# Patient Record
Sex: FEMALE | Race: WHITE | Employment: OTHER | URBAN - METROPOLITAN AREA
[De-identification: names, ages, dates, MRNs, and addresses within clinical notes are randomized per-mention and may not be internally consistent; named-entity substitution may affect disease eponyms.]

---

## 2017-10-03 ENCOUNTER — HOSPITAL ENCOUNTER (INPATIENT)
Age: 82
LOS: 3 days | Discharge: HOME HEALTH | DRG: 603 | End: 2017-10-06
Attending: EMERGENCY MEDICINE | Admitting: INTERNAL MEDICINE
Payer: MEDICARE

## 2017-10-03 DIAGNOSIS — L03.115 CELLULITIS OF RIGHT LOWER EXTREMITY: Primary | ICD-10-CM

## 2017-10-03 PROBLEM — I87.2 VENOUS INSUFFICIENCY OF BOTH LOWER EXTREMITIES: Status: ACTIVE | Noted: 2017-10-03

## 2017-10-03 LAB
ALBUMIN SERPL-MCNC: 2.3 G/DL (ref 3.4–5)
ALBUMIN/GLOB SERPL: 0.5 {RATIO} (ref 0.8–1.7)
ALP SERPL-CCNC: 101 U/L (ref 45–117)
ALT SERPL-CCNC: 37 U/L (ref 13–56)
ANION GAP SERPL CALC-SCNC: 9 MMOL/L (ref 3–18)
AST SERPL-CCNC: 35 U/L (ref 15–37)
BASOPHILS # BLD: 0 K/UL (ref 0–0.06)
BASOPHILS NFR BLD: 0 % (ref 0–2)
BILIRUB SERPL-MCNC: 0.7 MG/DL (ref 0.2–1)
BUN SERPL-MCNC: 24 MG/DL (ref 7–18)
BUN/CREAT SERPL: 18 (ref 12–20)
CALCIUM SERPL-MCNC: 8.7 MG/DL (ref 8.5–10.1)
CHLORIDE SERPL-SCNC: 100 MMOL/L (ref 100–108)
CO2 SERPL-SCNC: 26 MMOL/L (ref 21–32)
CREAT SERPL-MCNC: 1.35 MG/DL (ref 0.6–1.3)
DIFFERENTIAL METHOD BLD: ABNORMAL
EOSINOPHIL # BLD: 0.2 K/UL (ref 0–0.4)
EOSINOPHIL NFR BLD: 1 % (ref 0–5)
ERYTHROCYTE [DISTWIDTH] IN BLOOD BY AUTOMATED COUNT: 13.4 % (ref 11.6–14.5)
GLOBULIN SER CALC-MCNC: 4.8 G/DL (ref 2–4)
GLUCOSE SERPL-MCNC: 97 MG/DL (ref 74–99)
HCT VFR BLD AUTO: 32.9 % (ref 35–45)
HGB BLD-MCNC: 11 G/DL (ref 12–16)
LACTATE BLD-SCNC: 1.2 MMOL/L (ref 0.4–2)
LYMPHOCYTES # BLD: 1.5 K/UL (ref 0.9–3.6)
LYMPHOCYTES NFR BLD: 11 % (ref 21–52)
MCH RBC QN AUTO: 30.7 PG (ref 24–34)
MCHC RBC AUTO-ENTMCNC: 33.4 G/DL (ref 31–37)
MCV RBC AUTO: 91.9 FL (ref 74–97)
MONOCYTES # BLD: 1.1 K/UL (ref 0.05–1.2)
MONOCYTES NFR BLD: 8 % (ref 3–10)
NEUTS SEG # BLD: 10.8 K/UL (ref 1.8–8)
NEUTS SEG NFR BLD: 80 % (ref 40–73)
PLATELET # BLD AUTO: 387 K/UL (ref 135–420)
PMV BLD AUTO: 10.9 FL (ref 9.2–11.8)
POTASSIUM SERPL-SCNC: 4 MMOL/L (ref 3.5–5.5)
PROT SERPL-MCNC: 7.1 G/DL (ref 6.4–8.2)
RBC # BLD AUTO: 3.58 M/UL (ref 4.2–5.3)
SODIUM SERPL-SCNC: 135 MMOL/L (ref 136–145)
WBC # BLD AUTO: 13.6 K/UL (ref 4.6–13.2)

## 2017-10-03 PROCEDURE — 74011250636 HC RX REV CODE- 250/636: Performed by: EMERGENCY MEDICINE

## 2017-10-03 PROCEDURE — 77030033263 HC DRSG MEPILEX 16-48IN BORD MOLN -B

## 2017-10-03 PROCEDURE — 74011250637 HC RX REV CODE- 250/637: Performed by: INTERNAL MEDICINE

## 2017-10-03 PROCEDURE — 99284 EMERGENCY DEPT VISIT MOD MDM: CPT

## 2017-10-03 PROCEDURE — 93971 EXTREMITY STUDY: CPT

## 2017-10-03 PROCEDURE — 96365 THER/PROPH/DIAG IV INF INIT: CPT

## 2017-10-03 PROCEDURE — 87040 BLOOD CULTURE FOR BACTERIA: CPT | Performed by: EMERGENCY MEDICINE

## 2017-10-03 PROCEDURE — 96375 TX/PRO/DX INJ NEW DRUG ADDON: CPT

## 2017-10-03 PROCEDURE — 85025 COMPLETE CBC W/AUTO DIFF WBC: CPT | Performed by: EMERGENCY MEDICINE

## 2017-10-03 PROCEDURE — 74011000258 HC RX REV CODE- 258: Performed by: EMERGENCY MEDICINE

## 2017-10-03 PROCEDURE — 80053 COMPREHEN METABOLIC PANEL: CPT | Performed by: EMERGENCY MEDICINE

## 2017-10-03 PROCEDURE — 77030020186 HC BOOT HL PROTCT SAGE -B

## 2017-10-03 PROCEDURE — 83605 ASSAY OF LACTIC ACID: CPT

## 2017-10-03 PROCEDURE — 74011250636 HC RX REV CODE- 250/636: Performed by: INTERNAL MEDICINE

## 2017-10-03 PROCEDURE — 65270000029 HC RM PRIVATE

## 2017-10-03 RX ORDER — HEPARIN SODIUM 5000 [USP'U]/ML
5000 INJECTION, SOLUTION INTRAVENOUS; SUBCUTANEOUS EVERY 8 HOURS
Status: DISCONTINUED | OUTPATIENT
Start: 2017-10-03 | End: 2017-10-06 | Stop reason: HOSPADM

## 2017-10-03 RX ORDER — HYDRALAZINE HYDROCHLORIDE 25 MG/1
25 TABLET, FILM COATED ORAL 2 TIMES DAILY
COMMUNITY

## 2017-10-03 RX ORDER — LEVOTHYROXINE SODIUM 75 UG/1
75 TABLET ORAL
Status: DISCONTINUED | OUTPATIENT
Start: 2017-10-04 | End: 2017-10-06 | Stop reason: HOSPADM

## 2017-10-03 RX ORDER — AMLODIPINE BESYLATE 10 MG/1
10 TABLET ORAL DAILY
COMMUNITY

## 2017-10-03 RX ORDER — SODIUM CHLORIDE 9 MG/ML
75 INJECTION, SOLUTION INTRAVENOUS CONTINUOUS
Status: DISCONTINUED | OUTPATIENT
Start: 2017-10-03 | End: 2017-10-06 | Stop reason: HOSPADM

## 2017-10-03 RX ORDER — CLONIDINE HYDROCHLORIDE 0.1 MG/1
0.2 TABLET ORAL 2 TIMES DAILY
Status: DISCONTINUED | OUTPATIENT
Start: 2017-10-03 | End: 2017-10-06 | Stop reason: HOSPADM

## 2017-10-03 RX ORDER — LOSARTAN POTASSIUM 50 MG/1
50 TABLET ORAL DAILY
Status: DISCONTINUED | OUTPATIENT
Start: 2017-10-04 | End: 2017-10-06 | Stop reason: HOSPADM

## 2017-10-03 RX ORDER — HYDRALAZINE HYDROCHLORIDE 20 MG/ML
10 INJECTION INTRAMUSCULAR; INTRAVENOUS
Status: DISCONTINUED | OUTPATIENT
Start: 2017-10-03 | End: 2017-10-06 | Stop reason: HOSPADM

## 2017-10-03 RX ORDER — PANTOPRAZOLE SODIUM 40 MG/1
40 TABLET, DELAYED RELEASE ORAL DAILY
COMMUNITY

## 2017-10-03 RX ORDER — NYSTATIN 100000 [USP'U]/G
POWDER TOPICAL 2 TIMES DAILY
Status: DISCONTINUED | OUTPATIENT
Start: 2017-10-03 | End: 2017-10-06 | Stop reason: HOSPADM

## 2017-10-03 RX ORDER — FENTANYL 50 UG/1
1 PATCH TRANSDERMAL
COMMUNITY

## 2017-10-03 RX ORDER — FENTANYL CITRATE 50 UG/ML
25 INJECTION, SOLUTION INTRAMUSCULAR; INTRAVENOUS
Status: COMPLETED | OUTPATIENT
Start: 2017-10-03 | End: 2017-10-03

## 2017-10-03 RX ORDER — DOXYCYCLINE 100 MG/1
100 TABLET ORAL 2 TIMES DAILY
COMMUNITY
Start: 2017-10-03 | End: 2017-10-06

## 2017-10-03 RX ORDER — POLYETHYLENE GLYCOL 3350 17 G/17G
17 POWDER, FOR SOLUTION ORAL
COMMUNITY

## 2017-10-03 RX ADMIN — HEPARIN SODIUM 5000 UNITS: 5000 INJECTION, SOLUTION INTRAVENOUS; SUBCUTANEOUS at 12:32

## 2017-10-03 RX ADMIN — FENTANYL CITRATE 25 MCG: 50 INJECTION, SOLUTION INTRAMUSCULAR; INTRAVENOUS at 10:56

## 2017-10-03 RX ADMIN — HEPARIN SODIUM 5000 UNITS: 5000 INJECTION, SOLUTION INTRAVENOUS; SUBCUTANEOUS at 21:00

## 2017-10-03 RX ADMIN — SODIUM CHLORIDE 75 ML/HR: 900 INJECTION, SOLUTION INTRAVENOUS at 12:35

## 2017-10-03 RX ADMIN — SODIUM CHLORIDE 1000 MG: 900 INJECTION, SOLUTION INTRAVENOUS at 11:38

## 2017-10-03 RX ADMIN — CLONIDINE HYDROCHLORIDE 0.2 MG: 0.1 TABLET ORAL at 12:29

## 2017-10-03 RX ADMIN — CEFTRIAXONE 1 G: 1 INJECTION, POWDER, FOR SOLUTION INTRAMUSCULAR; INTRAVENOUS at 10:39

## 2017-10-03 RX ADMIN — NYSTATIN: 100000 POWDER TOPICAL at 23:00

## 2017-10-03 RX ADMIN — SODIUM CHLORIDE 1000 MG: 900 INJECTION, SOLUTION INTRAVENOUS at 15:30

## 2017-10-03 NOTE — PROGRESS NOTES
conducted an initial consultation and Spiritual Assessment for Hitesh Valentin, who is a 80 y.o.,female. Patients Primary Language is: [place]. According to the patients EMR Sabianist Affiliation is: Latter-day. The reason the Patient came to the hospital is:   Patient Active Problem List    Diagnosis Date Noted    Cellulitis of right leg 10/03/2017    Venous insufficiency of both lower extremities 10/03/2017    Leukocytosis 06/08/2015    UTI (urinary tract infection) 06/08/2015    History of CVA (cerebrovascular accident) 06/08/2015    Hypothyroidism 06/08/2015    Fall 06/08/2015    Hypertension 06/08/2015    GERD (gastroesophageal reflux disease) 06/08/2015    Lymphedema 06/08/2015    Legal blindness 06/08/2015    Osteoarthritis     Psoriatic arthritis ([facility] [address]         The  provided the following Interventions:  Initiated a relationship of care and support. Explored issues of [place], spirituality and/or Mormon needs while hospitalized. Listened empathically. Provided chaplaincy education. Provided information about Spiritual Care Services. Offered prayer and assurance of continued prayers on patient's behalf. Chart reviewed. The following outcomes were achieved:  Patient shared some information about their medical narrative and spiritual journey/beliefs. Patient processed feeling about current hospitalization. Patient expressed gratitude for the 's visit. Assessment:  Patient did not indicate any spiritual or Mormon issues which require Spiritual Care Services interventions at this time. Patient does not have any Mormon/cultural needs that will affect patients preferences in health care. Plan:  Chaplains will continue to follow and will provide pastoral care on an as needed or requested basis.  recommends bedside caregivers page  on duty if patient shows signs of acute spiritual or emotional distress.     [facility]. [facility]   [phone]

## 2017-10-03 NOTE — ACP (ADVANCE CARE PLANNING)
Patient has designated _________Dtr_______________ to participate in his/her discharge plan and to receive any needed information.      Name: Violeta Sheehan  Address:  Phone Luisito Hylton

## 2017-10-03 NOTE — ED PROVIDER NOTES
HPI Comments: 10:15 AM Jessica Espitia is a 80 y.o. Female w/ PMHx of peripheral edema, osteoarthritis, and hypothyroidism who presents to the ED c/o  bilateral lower leg cellulitis and edema onset 2 weeks ago. Pt has been home-bound for the last 2.5 years and is seen regularly by visiting physicians. 2 weeks ago pt was put on Keflex for the cellulitis, which was effective for the infection in the left leg. The following week the visiting physician noticed the infection was spreading to the right leg so the pt was put on doxycycline, which was ineffective; pt has 2-3 days left of doxycycline prescription. Pt has chronic urinary and bowel incontinence but has no hx of DVT or PE. Pt is not on blood thinning medication. Per pt's daughter, pt had a low grade fever of 100.1 last night. Additionally per pt's daughter pt has had episodes of emesis, although it is normally just yellow sputum. Pt has no other sx or complaints. Past Medical History:   Diagnosis Date    Arthropathy, unspecified, site unspecified     Chronic venous insufficiency 2/21/2013    Degenerative disc disease     Osteoarthritis     Peripheral edema 2/21/2013    Psoriatic arthritis (HCC)     Reflux     Thyroid disease     Unspecified cerebral artery occlusion with cerebral infarction     Unspecified cerebral artery occlusion with cerebral infarction     Unspecified hypothyroidism     Urinary frequency 2/21/2013       Past Surgical History:   Procedure Laterality Date    HX HYSTERECTOMY           History reviewed. No pertinent family history. Social History     Social History    Marital status:      Spouse name: N/A    Number of children: N/A    Years of education: N/A     Occupational History    Not on file.      Social History Main Topics    Smoking status: Former Smoker    Smokeless tobacco: Former User    Alcohol use No    Drug use: No    Sexual activity: Not on file     Other Topics Concern    Not on file Social History Narrative         ALLERGIES: Bactrim [sulfamethoprim]; E-mycin [erythromycin]; Iodine; and Milk containing products    Review of Systems   Constitutional: Positive for fever. Gastrointestinal: Positive for vomiting (of yellow sputum). Musculoskeletal:        Bilateral lower extremity edema     All other systems reviewed and are negative. Vitals:    10/03/17 1000 10/03/17 1004 10/03/17 1033 10/03/17 1045   BP: 170/87 170/68 151/63 157/55   Pulse: 88 87 79 76   Resp: 18 18 19 19   Temp: 99.8 °F (37.7 °C)      SpO2: 95% 96% 95% 94%   Weight: 113.4 kg (250 lb)      Height: 5' 8\" (1.727 m)               Physical Exam   Constitutional: She is oriented to person, place, and time. She appears well-developed and well-nourished. HENT:   Head: Normocephalic and atraumatic. Neck: Neck supple. No JVD present. Cardiovascular: Normal rate and regular rhythm. Pulmonary/Chest: Effort normal and breath sounds normal. No respiratory distress. Abdominal: Soft. She exhibits no distension. There is no tenderness. There is no rebound and no guarding. Musculoskeletal: She exhibits edema. 2+ BL LE edema    RLE anterior erythema patchy and warmth, no vesicles  No erythema over calf    LLE mild erythema,     DP 2+ BL  Cap refill 1 sec  Gross sensation intact BL LE   Neurological: She is alert and oriented to person, place, and time. Skin: Skin is warm and dry. There is erythema.    Psychiatric: Judgment normal.        MDM  Number of Diagnoses or Management Options  Diagnosis management comments: 79 y/o female presents with leg pain and redness    Will screen for sepsis with lactate, blood cultures    No hx of DVT or PE, but due to unilateral swelling will obtain duplex       Amount and/or Complexity of Data Reviewed  Clinical lab tests: ordered and reviewed  Tests in the radiology section of CPT®: ordered and reviewed      ED Course       Procedures  Vitals:  Patient Vitals for the past 12 hrs: Temp Pulse Resp BP SpO2   10/03/17 1045 - 76 19 157/55 94 %   10/03/17 1033 - 79 19 151/63 95 %   10/03/17 1004 - 87 18 170/68 96 %   10/03/17 1000 99.8 °F (37.7 °C) 88 18 170/87 95 %       Medications Ordered:  Medications   cefTRIAXone (ROCEPHIN) 1 g in 0.9% sodium chloride (MBP/ADV) 50 mL MBP (1 g IntraVENous New Bag 10/3/17 1039)   vancomycin (VANCOCIN) 1,000 mg in 0.9% sodium chloride (MBP/ADV) 250 mL adv (not administered)   vancomycin (VANCOCIN) 1,000 mg in 0.9% sodium chloride (MBP/ADV) 250 mL adv (not administered)   vancomycin (VANCOCIN) 1,250 mg in 0.9% sodium chloride 250 mL IVPB (not administered)   VANCOMYCIN INFORMATION NOTE (not administered)   fentaNYL citrate (PF) injection 25 mcg (25 mcg IntraVENous Given 10/3/17 1056)       Lab Findings:  Recent Results (from the past 12 hour(s))   CBC WITH AUTOMATED DIFF    Collection Time: 10/03/17 10:10 AM   Result Value Ref Range    WBC 13.6 (H) 4.6 - 13.2 K/uL    RBC 3.58 (L) 4.20 - 5.30 M/uL    HGB 11.0 (L) 12.0 - 16.0 g/dL    HCT 32.9 (L) 35.0 - 45.0 %    MCV 91.9 74.0 - 97.0 FL    MCH 30.7 24.0 - 34.0 PG    MCHC 33.4 31.0 - 37.0 g/dL    RDW 13.4 11.6 - 14.5 %    PLATELET 333 639 - 572 K/uL    MPV 10.9 9.2 - 11.8 FL    NEUTROPHILS 80 (H) 40 - 73 %    LYMPHOCYTES 11 (L) 21 - 52 %    MONOCYTES 8 3 - 10 %    EOSINOPHILS 1 0 - 5 %    BASOPHILS 0 0 - 2 %    ABS. NEUTROPHILS 10.8 (H) 1.8 - 8.0 K/UL    ABS. LYMPHOCYTES 1.5 0.9 - 3.6 K/UL    ABS. MONOCYTES 1.1 0.05 - 1.2 K/UL    ABS. EOSINOPHILS 0.2 0.0 - 0.4 K/UL    ABS.  BASOPHILS 0.0 0.0 - 0.06 K/UL    DF AUTOMATED     METABOLIC PANEL, COMPREHENSIVE    Collection Time: 10/03/17 10:10 AM   Result Value Ref Range    Sodium 135 (L) 136 - 145 mmol/L    Potassium 4.0 3.5 - 5.5 mmol/L    Chloride 100 100 - 108 mmol/L    CO2 26 21 - 32 mmol/L    Anion gap 9 3.0 - 18 mmol/L    Glucose 97 74 - 99 mg/dL    BUN 24 (H) 7.0 - 18 MG/DL    Creatinine 1.35 (H) 0.6 - 1.3 MG/DL    BUN/Creatinine ratio 18 12 - 20      GFR est AA 45 (L) >60 ml/min/1.73m2    GFR est non-AA 37 (L) >60 ml/min/1.73m2    Calcium 8.7 8.5 - 10.1 MG/DL    Bilirubin, total 0.7 0.2 - 1.0 MG/DL    ALT (SGPT) 37 13 - 56 U/L    AST (SGOT) 35 15 - 37 U/L    Alk. phosphatase 101 45 - 117 U/L    Protein, total 7.1 6.4 - 8.2 g/dL    Albumin 2.3 (L) 3.4 - 5.0 g/dL    Globulin 4.8 (H) 2.0 - 4.0 g/dL    A-G Ratio 0.5 (L) 0.8 - 1.7     POC LACTIC ACID    Collection Time: 10/03/17 10:10 AM   Result Value Ref Range    Lactic Acid (POC) 1.2 0.4 - 2.0 mmol/L         Progress notes, consult notes, or additional procedure notes:  11:00 AM, 10/3/2017   Consult:  Discussed care with Dr. Lyndon Whitfield, hospitalist. Standard discussion; including history of patients chief complaint, available diagnostic results, and treatment course. Agrees to admit. 1123 AM: Verbal report from Doppler tech neg for DVT. Diagnosis:   1. Cellulitis of right lower extremity        Disposition: admitted      [address] [place] [address] acting as a scribe for and in the presence of Dipesh Ellis DO      October 03, 2017 at 10:15 AM       Provider Attestation:      I personally performed the services described in the documentation, reviewed the documentation, as recorded by the scribe in my presence, and it accurately and completely records my words and actions.  October 03, 2017 at 10:15 AM - Dipesh Ellis DO

## 2017-10-03 NOTE — PROGRESS NOTES
Pharmacy Dosing Services: Vancomycin    Indication: cellulitis     Day of therapy: 0    Other Antimicrobials (Include dose, start day & day of therapy):  Ceftriaxone 1g IV q24h-10/3    Loading dose (date given): 2000mg-10/3  Current Maintenance dose: will start 1250mg IV 24h    Goal Vancomycin Level: 15-20  (Trough 15-20 for most infections, 20 for meningitis/osteomyelitis, pre-HD level ~25)    Vancomycin Level (if drawn): n/a     Significant Cultures:   10/3-Blood cx: pending     Renal function stable? (unstable defined as SCr increase of 0.5 mg/dL or > 50% increase from baseline, whichever is greater) (Y/N): Y     CAPD, Hemodialysis or Renal Replacement Therapy (Y/N): N     Recent Labs      10/03/17   1010   CREA  1.35*   BUN  24*   WBC  13.6*     Temp (24hrs), Av.8 °F (37.7 °C), Min:99.8 °F (37.7 °C), Max:99.8 °F (37.7 °C)    Creatinine Clearance (Creatinine Clearance (ml/min)): 38.8 ml/min     Regimen assessment: new consult   Maintenance dose: 1250 mg IV q24h (predicted trough ~ 16 mcg/dl)  Next scheduled level: 10/6 prior to 1100 dose        Pharmacy will follow daily and adjust medications as appropriate for renal function and/or serum levels.     Thank you,  Alexandria Angela

## 2017-10-03 NOTE — H&P
Hospitalist Admission Note    NAME:  Nancy Gupta   :   1930   MRN:   [id number]     Date/Time:  10/3/2017 5:42 PM    Subjective:     CHIEF COMPLAINT:    Chief Complaint   Patient presents with    Leg Pain       HISTORY OF PRESENT ILLNESS:     Bill Morton is a 80 y.o.  female with h/o chronic venous insufficiency,gerd,thyroid disease,lymphaedema,was brought to the hospital after she failed outpatient for rt leg cellulitis. Although patient is able to talk,most of the history is provided by daughter here present. The daughter reports that patient two weeks ago patient was treated with keflex for cellulitis of left leg. Then a week ago she was noted with cellulitis of right leg. She was started on doxycycline but noted that the cellulitis was getting worse. So it was decided to bring her to the emergency room. The daughter thinks that patient had some elevated temp. Daughter also says that patient has lymphoedema rt leg and has been told that nothing could be done. In ER,rt lower extremity duplex did not show any dvt. Lab showed wbc 13. 6. Temp was 99. 6. Patient was started on iv atbx for cellulitis of rt leg and admitted. Past Medical History:   Diagnosis Date    Arthropathy, unspecified, site unspecified     Chronic venous insufficiency 2013    Degenerative disc disease     Osteoarthritis     Peripheral edema 2013    Psoriatic arthritis (HCC)     Reflux     Thyroid disease     Unspecified cerebral artery occlusion with cerebral infarction     Unspecified cerebral artery occlusion with cerebral infarction     Unspecified hypothyroidism     Urinary frequency 2013        Social History   Substance Use Topics    Smoking status: Former Smoker    Smokeless tobacco: Former User    Alcohol use No        History reviewed. No pertinent family history.      Allergies   Allergen Reactions    Bactrim [Sulfamethoprim] Nausea and Vomiting    E-Mycin [Erythromycin] Unknown (comments)    Iodine Unknown (comments)    Milk Containing Products Other (comments)     Gi distress        Prior to Admission medications    Medication Sig Start Date End Date Taking? Authorizing Provider   amLODIPine (NORVASC) 10 mg tablet Take 10 mg by mouth daily. Yes Historical Provider   hydrALAZINE (APRESOLINE) 25 mg tablet Take 25 mg by mouth two (2) times a day. Yes Historical Provider   pantoprazole (PROTONIX) 40 mg tablet Take 40 mg by mouth daily. Yes Historical Provider   doxycycline (ADOXA) 100 mg tablet Take 100 mg by mouth two (2) times a day. 10/3/17 10/4/17 Yes Historical Provider   fentaNYL (DURAGESIC) 50 mcg/hr PATCH 1 Patch by TransDERmal route every seventy-two (72) hours. Yes Historical Provider   polyethylene glycol (MIRALAX) 17 gram packet Take 17 g by mouth daily as needed. Historical Provider   levothyroxine (SYNTHROID) 75 mcg tablet Take 1 Tab by mouth Daily (before breakfast). Patient taking differently: Take 175 mcg by mouth Daily (before breakfast). 6/11/15   MANNY Roberts       REVIEW OF SYSTEMS:    Constitutional:  Possible fever as per daughter. No weight loss  HEENT:  No headache or visual changes  Cardiovascular:  No chest pain, no palpitations. Respiratory:  No coughing, wheezing, or shortness of breath. GI:  No abdominal pain. No nausea or vomiting. No diarrhea  :  No hematuria or dysuria. No frequency, retention, urinary incontinence. Skin:  No rashes or moles  Neuro:  No seizures or syncope  Hematological:  No bruising or bleeding  Endocrine:  No diabetes or thyroid disease  Extr:swelling and redness of rt leg. Objective:   VITALS:       Visit Vitals    /74    Pulse 73    Temp 98.9 °F (37.2 °C)    Resp 20    Ht 5' 8\" (1.727 m)    Wt 112 kg (247 lb)    SpO2 91%    BMI 37.56 kg/m2       O2 Device: Room air    PHYSICAL EXAM:   General:    Lying in bed in no acute distress. HEENT:  Pupils equal.  Sclera anicteric. Conjunctiva pink.   Mucous membranes moist  Neck:  Supple. Trachea midline. No accessory muscle use. No thyromegaly. No jugular venous distention  CV:                  Regular rate and rhythm. Lungs:   Clear to auscultation bilaterally. No Wheezing or Rhonchi. No rales. Normal to percussion  Abdomen:   Soft, non-tender. Not distended. Bowel sounds normal. No organomegaly  Extremities: Lymphoedema rt leg,redness,warm. Neurologic: Alert and oriented X 3. Skin:                Warm and dry. No rashes. Back:               Pressure ulcers in buttocks      LAB DATA REVIEWED:    Recent Results (from the past 24 hour(s))   CBC WITH AUTOMATED DIFF    Collection Time: 10/03/17 10:10 AM   Result Value Ref Range    WBC 13.6 (H) 4.6 - 13.2 K/uL    RBC 3.58 (L) 4.20 - 5.30 M/uL    HGB 11.0 (L) 12.0 - 16.0 g/dL    HCT 32.9 (L) 35.0 - 45.0 %    MCV 91.9 74.0 - 97.0 FL    MCH 30.7 24.0 - 34.0 PG    MCHC 33.4 31.0 - 37.0 g/dL    RDW 13.4 11.6 - 14.5 %    PLATELET 801 867 - 848 K/uL    MPV 10.9 9.2 - 11.8 FL    NEUTROPHILS 80 (H) 40 - 73 %    LYMPHOCYTES 11 (L) 21 - 52 %    MONOCYTES 8 3 - 10 %    EOSINOPHILS 1 0 - 5 %    BASOPHILS 0 0 - 2 %    ABS. NEUTROPHILS 10.8 (H) 1.8 - 8.0 K/UL    ABS. LYMPHOCYTES 1.5 0.9 - 3.6 K/UL    ABS. MONOCYTES 1.1 0.05 - 1.2 K/UL    ABS. EOSINOPHILS 0.2 0.0 - 0.4 K/UL    ABS.  BASOPHILS 0.0 0.0 - 0.06 K/UL    DF AUTOMATED     METABOLIC PANEL, COMPREHENSIVE    Collection Time: 10/03/17 10:10 AM   Result Value Ref Range    Sodium 135 (L) 136 - 145 mmol/L    Potassium 4.0 3.5 - 5.5 mmol/L    Chloride 100 100 - 108 mmol/L    CO2 26 21 - 32 mmol/L    Anion gap 9 3.0 - 18 mmol/L    Glucose 97 74 - 99 mg/dL    BUN 24 (H) 7.0 - 18 MG/DL    Creatinine 1.35 (H) 0.6 - 1.3 MG/DL    BUN/Creatinine ratio 18 12 - 20      GFR est AA 45 (L) >60 ml/min/1.73m2    GFR est non-AA 37 (L) >60 ml/min/1.73m2    Calcium 8.7 8.5 - 10.1 MG/DL    Bilirubin, total 0.7 0.2 - 1.0 MG/DL    ALT (SGPT) 37 13 - 56 U/L    AST (SGOT) 35 15 - 37 U/L    Alk. phosphatase 101 45 - 117 U/L    Protein, total 7.1 6.4 - 8.2 g/dL    Albumin 2.3 (L) 3.4 - 5.0 g/dL    Globulin 4.8 (H) 2.0 - 4.0 g/dL    A-G Ratio 0.5 (L) 0.8 - 1.7     POC LACTIC ACID    Collection Time: 10/03/17 10:10 AM   Result Value Ref Range    Lactic Acid (POC) 1.2 0.4 - 2.0 mmol/L       Assessment/Plan:      Principal Problem:    Cellulitis of right leg (10/3/2017)    Active Problems:    Osteoarthritis ()      Psoriatic arthritis (HCC) ()      History of CVA (cerebrovascular accident) (6/8/2015)      Hypothyroidism (6/8/2015)      Hypertension (6/8/2015)      Legal blindness (6/8/2015)      Venous insufficiency of both lower extremities (10/3/2017)      Decubitus ulcers in buttock  ___________________________________________________  PLAN:    1. Cellulitis of right leg:s/p failed outpatient treatment with doxycycline    -On ceftriaxone and vanc.    -Elevate leg    -Blood cx     2. Hypothyroidism:    -On synthroid  3. Venous insufficiency    -Elevate leg    -No dvt on duplex  4. HTN    -Hydralazine prn  5. Osteoarthritis    -Tylenol prn for pain  6. Hx cva   7. Hx psoriasis  8. Decubitus ulcers:wound care consult    DVT prophylaxis:scds heparin  Full code  Disposition:tbd       ___________________________________________________  Admitting Physician: Jimi Bee MD

## 2017-10-03 NOTE — IP AVS SNAPSHOT
[address] [address] 
[phone] Patient: Edmond Glaser MRN: [id number] SXH:[id number] You are allergic to the following Allergen Reactions Bactrim (Sulfamethoprim) Nausea and Vomiting  
    
 E-Mycin (Erythromycin) Unknown (comments) Iodine Unknown (comments) Milk Containing Products Other (comments) Gi distress Recent Documentation Height Weight BMI OB Status Smoking Status 1.727 m 118.8 kg 39.82 kg/m2 Hysterectomy Former Smoker Unresulted Labs Order Current Status Isabella Ayers In process CULTURE, BLOOD Preliminary result CULTURE, BLOOD Preliminary result Emergency Contacts Name Discharge Info Relation Home Work Mobile [facility]  Child [2] [phone] About your hospitalization You were admitted on:  October 3, 2017 You last received care in the:  [address] [place], [place] [address] You were discharged on:  October 6, 2017 Unit phone number:  [phone] Why you were hospitalized Your primary diagnosis was:  Cellulitis Of Right Leg Your diagnoses also included:  Venous Insufficiency Of Both Lower Extremities, History Of Cva (Cerebrovascular Accident), Hypertension, Hypothyroidism, Legal Blindness, Osteoarthritis, Psoriatic Arthritis (Hcc) Providers Seen During Your Hospitalizations Provider Role Specialty Primary office phone Dipesh Ellis DO Attending Provider Emergency Medicine [phone] Mahnaz De Los Santos MD Attending Provider Internal Medicine [phone] Your Primary Care Physician (PCP) Primary Care Physician Office Phone Office Fax Kim Hsu [phone] Follow-up Information Follow up With Details Comments Contact Info  Philip Ramos DO Schedule an appointment as soon as possible for a visit make follow up appointment for 1 week [address] [facility] [address] [id number] 
[phone] Current Discharge Medication List  
  
START taking these medications Dose & Instructions Dispensing Information Comments Morning Noon Evening Bedtime  
 clindamycin 300 mg capsule Commonly known as:  CLEOCIN Your last dose was: Your next dose is:    
   
   
 Dose:  300 mg Take 1 Cap by mouth three (3) times daily. Quantity:  15 Cap Refills:  0  
     
   
   
   
  
 nystatin powder Commonly known as:  MYCOSTATIN Your last dose was: Your next dose is:    
   
   
 Apply  to affected area two (2) times a day. Quantity:  1 Bottle Refills:  0 CONTINUE these medications which have CHANGED Dose & Instructions Dispensing Information Comments Morning Noon Evening Bedtime  
 levothyroxine 75 mcg tablet Commonly known as:  SYNTHROID What changed:  how much to take Your last dose was: Your next dose is:    
   
   
 Dose:  75 mcg Take 1 Tab by mouth Daily (before breakfast). Quantity:  30 Tab Refills:  0 CONTINUE these medications which have NOT CHANGED Dose & Instructions Dispensing Information Comments Morning Noon Evening Bedtime  
 fentaNYL 50 mcg/hr PATCH Commonly known as:  Alvaro Garcia Your last dose was: Your next dose is:    
   
   
 Dose:  1 Patch 1 Patch by TransDERmal route every seventy-two (72) hours. Refills:  0  
     
   
   
   
  
 hydrALAZINE 25 mg tablet Commonly known as:  APRESOLINE Your last dose was: Your next dose is:    
   
   
 Dose:  25 mg Take 25 mg by mouth two (2) times a day. Refills:  0 MIRALAX 17 gram packet Generic drug:  polyethylene glycol Your last dose was: Your next dose is:    
   
   
 Dose:  17 g Take 17 g by mouth daily as needed. Refills:  0 NORVASC 10 mg tablet Generic drug:  amLODIPine Your last dose was: Your next dose is:    
   
   
 Dose:  10 mg Take 10 mg by mouth daily. Refills:  0 PROTONIX 40 mg tablet Generic drug:  pantoprazole Your last dose was: Your next dose is:    
   
   
 Dose:  40 mg Take 40 mg by mouth daily. Refills:  0 STOP taking these medications   
 doxycycline 100 mg tablet Commonly known as:  ADOXA Where to Get Your Medications Information on where to get these meds will be given to you by the nurse or doctor. ! Ask your nurse or doctor about these medications  
  clindamycin 300 mg capsule  
 nystatin powder Discharge Instructions Patient armband removed and shredded DISCHARGE SUMMARY from Nurse The following personal items are in your possession at time of discharge: 
 
Dental Appliances: None Visual Aid: None Home Medications: Sent home Clothing: Other (comment) (gown) PATIENT INSTRUCTIONS: 
 
 
F-face looks uneven A-arms unable to move or move unevenly S-speech slurred or non-existent T-time-call 911 as soon as signs and symptoms begin-DO NOT go Back to bed or wait to see if you get better-TIME IS BRAIN. Warning Signs of HEART ATTACK Call 911 if you have these symptoms: 
? Chest discomfort. Most heart attacks involve discomfort in the center of the chest that lasts more than a few minutes, or that goes away and comes back. It can feel like uncomfortable pressure, squeezing, fullness, or pain. ? Discomfort in other areas of the upper body.  Symptoms can include pain or discomfort in one or both arms, the back, neck, jaw, or stomach. ? Shortness of breath with or without chest discomfort. ? Other signs may include breaking out in a cold sweat, nausea, or lightheadedness. Don't wait more than five minutes to call [address]! Fast action can save your life. Calling 911 is almost always the fastest way to get lifesaving treatment. Emergency Medical Services staff can begin treatment when they arrive  up to an hour sooner than if someone gets to the hospital by car. The discharge information has been reviewed with the patient and caregiver. The patient and caregiver verbalized understanding. Discharge medications reviewed with the patient and caregiver and appropriate educational materials and side effects teaching were provided. Cellulitis: Care Instructions Your Care Instructions Cellulitis is a skin infection. It often occurs after a break in the skin from a scrape, cut, bite, or puncture, or after a rash. The doctor has checked you carefully, but problems can develop later. If you notice any problems or new symptoms, get medical treatment right away. Follow-up care is a key part of your treatment and safety. Be sure to make and go to all appointments, and call your doctor if you are having problems. It's also a good idea to know your test results and keep a list of the medicines you take. How can you care for yourself at home? · Take your antibiotics as directed. Do not stop taking them just because you feel better. You need to take the full course of antibiotics. · Prop up the infected area on pillows to reduce pain and swelling. Try to keep the area above the level of your heart as often as you can. · If your doctor told you how to care for your wound, follow your doctor's instructions. If you did not get instructions, follow this general advice: ¨ Wash the wound with clean water 2 times a day.  Don't use hydrogen peroxide or alcohol, which can slow healing. ¨ You may cover the wound with a thin layer of petroleum jelly, such as Vaseline, and a nonstick bandage. ¨ Apply more petroleum jelly and replace the bandage as needed. · Be safe with medicines. Take pain medicines exactly as directed. ¨ If the doctor gave you a prescription medicine for pain, take it as prescribed. ¨ If you are not taking a prescription pain medicine, ask your doctor if you can take an over-the-counter medicine. To prevent cellulitis in the future · Try to prevent cuts, scrapes, or other injuries to your skin. Cellulitis most often occurs where there is a break in the skin. · If you get a scrape, cut, mild burn, or bite, wash the wound with clean water as soon as you can to help avoid infection. Don't use hydrogen peroxide or alcohol, which can slow healing. · If you have swelling in your legs (edema), support stockings and good skin care may help prevent leg sores and cellulitis. · Take care of your feet, especially if you have diabetes or other conditions that increase the risk of infection. Wear shoes and socks. Do not go barefoot. If you have athlete's foot or other skin problems on your feet, talk to your doctor about how to treat them. When should you call for help? Call your doctor now or seek immediate medical care if: 
· You have signs that your infection is getting worse, such as: 
¨ Increased pain, swelling, warmth, or redness. ¨ Red streaks leading from the area. ¨ Pus draining from the area. ¨ A fever. · You get a rash. Watch closely for changes in your health, and be sure to contact your doctor if: 
· You are not getting better after 1 day (24 hours). · You do not get better as expected. Where can you learn more? Go to http://davidson-kalee.info/. Eduardo Daily in the search box to learn more about \"Cellulitis: Care Instructions. \" Current as of: October 13, 2016 Content Version: 11.3 © 2324-0141 Healthwise, Incorporated. Care instructions adapted under license by [employer] (which disclaims liability or warranty for this information). If you have questions about a medical condition or this instruction, always ask your healthcare professional. [address] any warranty or liability for your use of this information. DASH Diet: Care Instructions Your Care Instructions The DASH diet is an eating plan that can help lower your blood pressure. DASH stands for Dietary Approaches to Stop Hypertension. Hypertension is high blood pressure. The DASH diet focuses on eating foods that are high in calcium, potassium, and magnesium. These nutrients can lower blood pressure. The foods that are highest in these nutrients are fruits, vegetables, low-fat dairy products, nuts, seeds, and legumes. But taking calcium, potassium, and magnesium supplements instead of eating foods that are high in those nutrients does not have the same effect. The DASH diet also includes whole grains, fish, and poultry. The DASH diet is one of several lifestyle changes your doctor may recommend to lower your high blood pressure. Your doctor may also want you to decrease the amount of sodium in your diet. Lowering sodium while following the DASH diet can lower blood pressure even further than just the DASH diet alone. Follow-up care is a key part of your treatment and safety. Be sure to make and go to all appointments, and call your doctor if you are having problems. It's also a good idea to know your test results and keep a list of the medicines you take. How can you care for yourself at home? Following the DASH diet · Eat 4 to 5 servings of fruit each day. A serving is 1 medium-sized piece of fruit, ½ cup chopped or canned fruit, 1/4 cup dried fruit, or 4 ounces (½ cup) of fruit juice. Choose fruit more often than fruit juice. · Eat 4 to 5 servings of vegetables each day. A serving is 1 cup of lettuce or raw leafy vegetables, ½ cup of chopped or cooked vegetables, or 4 ounces (½ cup) of vegetable juice. Choose vegetables more often than vegetable juice. · Get 2 to 3 servings of low-fat and fat-free dairy each day. A serving is 8 ounces of milk, 1 cup of yogurt, or 1 ½ ounces of cheese. · Eat 6 to 8 servings of grains each day. A serving is 1 slice of bread, 1 ounce of dry cereal, or ½ cup of cooked rice, pasta, or cooked cereal. Try to choose whole-grain products as much as possible. · Limit lean meat, poultry, and fish to 2 servings each day. A serving is 3 ounces, about the size of a deck of cards. · Eat 4 to 5 servings of nuts, seeds, and legumes (cooked dried beans, lentils, and split peas) each week. A serving is 1/3 cup of nuts, 2 tablespoons of seeds, or ½ cup of cooked beans or peas. · Limit fats and oils to 2 to 3 servings each day. A serving is 1 teaspoon of vegetable oil or 2 tablespoons of salad dressing. · Limit sweets and added sugars to 5 servings or less a week. A serving is 1 tablespoon jelly or jam, ½ cup sorbet, or 1 cup of lemonade. · Eat less than 2,300 milligrams (mg) of sodium a day. If you limit your sodium to 1,500 mg a day, you can lower your blood pressure even more. Tips for success · Start small. Do not try to make dramatic changes to your diet all at once. You might feel that you are missing out on your favorite foods and then be more likely to not follow the plan. Make small changes, and stick with them. Once those changes become habit, add a few more changes. · Try some of the following: ¨ Make it a goal to eat a fruit or vegetable at every meal and at snacks. This will make it easy to get the recommended amount of fruits and vegetables each day. ¨ Try yogurt topped with fruit and nuts for a snack or healthy dessert. ¨ Add lettuce, tomato, cucumber, and onion to sandwiches. ¨ Combine a ready-made pizza crust with low-fat mozzarella cheese and lots of vegetable toppings. Try using tomatoes, squash, spinach, broccoli, carrots, cauliflower, and onions. ¨ Have a variety of cut-up vegetables with a low-fat dip as an appetizer instead of chips and dip. ¨ Sprinkle sunflower seeds or chopped almonds over salads. Or try adding chopped walnuts or almonds to cooked vegetables. ¨ Try some vegetarian meals using beans and peas. Add garbanzo or kidney beans to salads. Make burritos and tacos with mashed george beans or black beans. Where can you learn more? Go to http://davidson-kalee.info/. Enter S396 in the search box to learn more about \"DASH Diet: Care Instructions. \" Current as of: April 3, 2017 Content Version: 11.3 © 9085-6207 [employer]. Care instructions adapted under license by [employer] (which disclaims liability or warranty for this information). If you have questions about a medical condition or this instruction, always ask your healthcare professional. [address] [address] any warranty or liability for your use of this information. Discharge Orders None [employer] Announcement We are excited to announce that we are making your provider's discharge notes available to you in [employer]. You will see these notes when they are completed and signed by the physician that discharged you from your recent hospital stay. If you have any questions or concerns about any information you see in [employer] [facility], please call the Health Information Department where you were seen or reach out to your Primary Care Provider for more information about your plan of care. Introducing [facility]! Willis Pretty introduces [employer] patient portal. Now you can access parts of your medical record, email your doctor's office, and request medication refills online.    
 
1. In your internet browser, go to [email or web address] 2. Click on the First Time User? Click Here link in the Sign In box. You will see the New Member Sign Up page. 3. Enter your [email or web address] Access Code exactly as it appears below. You will not need to use this code after youve completed the sign-up process. If you do not sign up before the expiration date, you must request a new code. · [email or web address] Access Code: [email or web address] Expires: 1/4/2018  1:55 PM 
 
4. Enter the last four digits of your Social Security Number (xxxx) and Date of Birth (mm/dd/yyyy) as indicated and click Submit. You will be taken to the next sign-up page. 5. Create a [email or web address] ID. This will be your [email or web address] login ID and cannot be changed, so think of one that is secure and easy to remember. 6. Create a [email or web address] password. You can change your password at any time. 7. Enter your Password Reset Question and Answer. This can be used at a later time if you forget your password. 8. Enter your e-mail address. You will receive e-mail notification when new information is available in [address] 9. Click Sign Up. You can now view and download portions of your medical record. 10. Click the Download Summary menu link to download a portable copy of your medical information. If you have questions, please visit the Frequently Asked Questions section of the [email or web address] website. Remember, [email or web address] is NOT to be used for urgent needs. For medical emergencies, dial 911. Now available from your iPhone and Android! General Information Please provide this summary of care documentation to your next provider. Patient Signature:  ____________________________________________________________ Date:  ____________________________________________________________  
  
Aquilino Mais Provider Signature:  ____________________________________________________________ Date:  ____________________________________________________________

## 2017-10-03 NOTE — ED NOTES
Wounds on sacrum diffuse & excoriated with white odorous substance & yellow weeping, red all over sacral area, blanchable, skin breakdown all over. 1cm around skin wound on left sacral area, thick brown flacky scab like plaque behind BL knees. Pt sitting in large area of urine on pad from home with multiple pads stuffed in between legs, pt changed & mepi plex applied to sacral area. Pt c/o pain in BL legs when turned but was able to help with turning.

## 2017-10-03 NOTE — PROCEDURES
[facility]  *** FINAL REPORT ***    Name: Briseyda Bowie  MRN: [id number]    Outpatient  : 1930  HIS Order #: [id number]  [address] [facility] Visit #: [id number]  Date: 03 Oct 2017    TYPE OF TEST: Peripheral Venous Testing    REASON FOR TEST  Pain in limb, Limb swelling, Cellulitis    Right Leg:-  Deep venous thrombosis:           No  Superficial venous thrombosis:    No  Deep venous insufficiency:        Not examined  Superficial venous insufficiency: Not examined      INTERPRETATION/FINDINGS  Duplex images were obtained using 2-D gray scale, color flow, and  spectral Doppler analysis. Right leg :  Technically difficult, limited examination with sub optimal imaging  due to inability to position patient properly, as well as patient body   habitus and pain. 1. Deep vein(s) visualized include the common femoral, proximal  femoral, mid femoral, distal femoral and posterior tibial veins. Unable to visualize the popliteal and peroneal veins. 2. No evidence of deep venous thrombosis detected in the veins  visualized. 3. No evidence of deep vein thrombosis in the contralateral common  femoral vein. 4. No evidence of superficial thrombosis detected. ADDITIONAL COMMENTS  Wet reading given to Dr. Sabi Craven at 11:22 a.m. I have personally reviewed the data relevant to the interpretation of  this  study. TECHNOLOGIST: Madeline Bain. Devika Findsergio  Signed: 10/03/2017 11:30 AM    PHYSICIAN: Víctor Ocampo.  Jose Le MD  Signed: 10/03/2017 09:52 PM

## 2017-10-03 NOTE — PROGRESS NOTES
Physical Exam   Skin:             Redness and swelling to both lower extremities. Excoriation to perianal area. Red rashes on left popliteal and  Right gluteal folds.

## 2017-10-03 NOTE — ED TRIAGE NOTES
Pt came in by EMS with c/o leg pain & cellulitis that is not improving. Reported pt bed bound with pressure sores on sacrum.

## 2017-10-03 NOTE — ROUTINE PROCESS
1400 Patient admitted to the floor. IVF infusing. Noted redness on BLE and c/o pain on movement. Noted fentany patch on right shoulder dated 10/01/17. Noted excoriation and appears to be fungal rashes on perianal area, back of left knee, right gluteal folds. Dr Jaren Kennedy aware and new orders made. 0 Incontinent care done. Repositioned comfortably in the bed. 1930 Bedside and Verbal shift change report given to [facility] RN (oncoming nurse) by Moi Bermudez RN (offgoing nurse). Report included the following information SBAR, Kardex, Intake/Output, MAR and Recent Results.

## 2017-10-03 NOTE — PROGRESS NOTES
[place]   Discharge Planning/ Assessment    Reasons for Intervention: A Medicare part a and b, and  for [employer]. Dr Flakita Heart is her PCP, last seen last Wednesday. Pt lives with daughter Paulett Olszewski [phone], and daughter/ grand daughter/ son in law assists with all care for pt. She is bed bound and is not able to sit up or move around much by herself. The nurse from [facility] AT [address] [place] [address] comes twice a week for about 30 minutes to check pt, pt wounds ( sacral wound). She has the visiting Physicians that care for her. We discussed the possible need for home IV antibiotics, and possible home wound care. Daughter says that there would be no need for Home Health PT/OT, that her mother wound not benefit from them. She has had them in the past, pt even once went to [facility]. for rehab but they discharged her for her having high blood pressure. Plan is home with [place] if needed. She will need to be transported by Medical Amb[facility], pt requests two big men for transport since she weighs more than 200 lbs.     High Risk Criteria  [] Yes  [x]No   Physician Referral  [] Yes  [x]No        Date    Nursing Referral  [] Yes  [x]No        Date    Patient/Family Request  [] Yes  [x]No        Date       Resources:    Medicare  [x] Yes  []No   Medicaid  [] Yes  [x]No   No Resources  [] Yes  [x]No   Private Insurance  [] Yes  [x]No    Name/Phone Number    Other  [x] Yes  []No        (i.e. Workman's Comp)         Prior Services:    Prior Services  [x] Yes  []No   Home Health  [x] Yes  []No   [address]  [] Yes  [x]No        Number of [address]  [] Yes  [x]No       Meals on Wheels  [] Yes  [x]No   Office on Aging  [] Yes  [x]No   Transportation Services  [x] Yes  []No   Nursing Home  [] Yes  [x]No        Nursing Home Name    [address]  [] Yes  [x]No        [address] Name    Other       Information Source:      Information obtained from [x] Patient  [] Parent   [] Guardian  [x] Child  [] Spouse   [] Significant Other/Partner   [] Friend      [] EMS    [] Nursing Home Chart          [] Other:   Chart Review  [x] Yes  []No     Family/Support System:    Patient lives with  [] Alone    [] Spouse   [] Significant Other  [x] Children  [] Caretaker   [] Parent  [] Sibling     [] Other       Other Support System:    Is the patient responsible for care of others  [] Yes  [x]No   Information of person caring for patient on  discharge    Managers financial affairs independently  [] Yes  [x]No   If no, explain:      Status Prior to Admission:    Mental Status  [x] Awake  [] Alert  [] Oriented  [] Quiet/Calm [] Lethargic/Sedated   [] Disoriented  [] Restless/Anxious  [] Combative   Personal Care  [x] Dependent  [] [address]  [] Requires Assistance   Meal Preparation Ability  [] Independent   [] Standby Assistance   [] Minimal Assistance   [] Moderate Assistance  [] Maximum Assistance     [x] Total Assistance   Chores  [] Independent with Chores   [x] N/A Nursing Home Resident   [] Requires Assistance   Bowel/Bladder  [] Continent  [] Catheter  [x] Incontinent  [] Ostomy Self-Care    [] Urine Diversion Self-Care  [] Maximum Assistance     [] Total Assistance   Number of Persons needed for assistance    DME at home  [] Dianne Coronado  [] Hannah Coronado   [] Commode    [] Bathroom/Grab Bars  [] Hospital Bed  [] Nebulizer  [] Oxygen           [] Raised Toilet Seat  [] Shower Chair  [] Side Rails for Bed   [] Tub Transfer Bench   [] Shasta Plata  [] Connee Simmering, Standard      [] Other:   Vendor      Treatment Presently Receiving:    Current Treatments  [] Chemotherapy  [] Dialysis  [] Insulin  [] IVAB [x] IVF   [] O2  [] PCA   [] PT   [] RT   [] Tube Feedings   [] Wound Care     Psychosocial Evaluation:    Verbalized Knowledge of Disease Process  [] Patient  []Family   Coping with Disease Process  [] Patient  []Family   Requires Further Counseling Coping with Disease Process  [] Patient  []Family     Identified Projected Needs:    Home Health Aid  [] Yes  [x]No   Transportation  [x] Yes  []No   Education  [] Yes  [x]No        Specific Education     Financial Counseling  [] Yes  [x]No   Inability to Care for Self/Will Require 24 hour care  [] Yes  [x]No   Pain Management  [] Yes  [x]No   Home Infusion Therapy  [x] Yes  []No   Oxygen Therapy  [] Yes  [x]No   DME  [] Yes  [x]No   Long Term Care Placement  [] Yes  [x]No   Rehab  [] Yes  [x]No   Physical Therapy  [] Yes  [x]No   Needs Anticipated At This Time  [x] Yes  []No     Intra-Hospital Referral:    [address]  [] Yes  [x]No     [] Yes  [x]No   Patient Representative  [] Yes  [x]No   Staff for Teaching Needs  [] Yes  [x]No   Specialty Teaching Needs     Diabetic Educator  [] Yes  [x]No   Referral for Diabetic Educator Needed  [] Yes  [x]No  If Yes, place order for Nutritionist or Diabetic Consult     Tentative Discharge Plan:    Home with No Services  [] Yes  [x]No   Home with Home Health Follow-up  [x] Yes  []No        If Yes, specify type    Home Care Program  [] Yes  [x]No        If Yes, specify type    Meals on Wheels  [] Yes  [x]No   Office of Aging  [] Yes  [x]No   NHP  [] Yes  [x]No   Return to the Nursing Home  [] Yes  [x]No   Rehab Therapy  [] Yes  [x]No   Acute Rehab  [] Yes  [x]No   Subacute Rehab  [] Yes  [x]No   Private Care  [] Yes  [x]No   Substance Abuse Referral  [] Yes  [x]No   Transportation  [] Yes  [x]No   Chore Service  [] Yes  [x]No   Inpatient Hospice  [] Yes  [x]No   OP RT  [] Yes  [x] No   OP Hemo  [] Yes  [x] No   OP PT  [] Yes  [x]No   Support Group  [] Yes  [x]No   Reach to Recovery  [] Yes  [x]No   OP Oncology Clinic  [] Yes  [x]No   Clinic Appointment  [] Yes  [x]No   DME  [] Yes  [x]No   Comments    Name of D/C Planner or  Given to Patient or Family Deuce Colón.  Jeannie Schultz   Phone Number         [phone]   Date 10/3/17   Time    If you are discharged home, whom do you designate to participate in your discharge plan and receive any information needed?      Enter name of designee dtr        Phone # of designee         Address of designee         Updated         Patient refused to designate any           individual

## 2017-10-04 LAB
ANION GAP SERPL CALC-SCNC: 10 MMOL/L (ref 3–18)
BASOPHILS # BLD: 0 K/UL (ref 0–0.06)
BASOPHILS NFR BLD: 0 % (ref 0–2)
BUN SERPL-MCNC: 27 MG/DL (ref 7–18)
BUN/CREAT SERPL: 20 (ref 12–20)
CALCIUM SERPL-MCNC: 7.9 MG/DL (ref 8.5–10.1)
CHLORIDE SERPL-SCNC: 105 MMOL/L (ref 100–108)
CO2 SERPL-SCNC: 23 MMOL/L (ref 21–32)
CREAT SERPL-MCNC: 1.32 MG/DL (ref 0.6–1.3)
DIFFERENTIAL METHOD BLD: ABNORMAL
EOSINOPHIL # BLD: 0.3 K/UL (ref 0–0.4)
EOSINOPHIL NFR BLD: 3 % (ref 0–5)
ERYTHROCYTE [DISTWIDTH] IN BLOOD BY AUTOMATED COUNT: 13.8 % (ref 11.6–14.5)
GLUCOSE SERPL-MCNC: 81 MG/DL (ref 74–99)
HCT VFR BLD AUTO: 28.5 % (ref 35–45)
HGB BLD-MCNC: 9.3 G/DL (ref 12–16)
LYMPHOCYTES # BLD: 1.2 K/UL (ref 0.9–3.6)
LYMPHOCYTES NFR BLD: 13 % (ref 21–52)
MCH RBC QN AUTO: 30.6 PG (ref 24–34)
MCHC RBC AUTO-ENTMCNC: 32.6 G/DL (ref 31–37)
MCV RBC AUTO: 93.8 FL (ref 74–97)
MONOCYTES # BLD: 0.8 K/UL (ref 0.05–1.2)
MONOCYTES NFR BLD: 9 % (ref 3–10)
NEUTS SEG # BLD: 6.7 K/UL (ref 1.8–8)
NEUTS SEG NFR BLD: 75 % (ref 40–73)
PLATELET # BLD AUTO: 310 K/UL (ref 135–420)
PMV BLD AUTO: 11 FL (ref 9.2–11.8)
POTASSIUM SERPL-SCNC: 4.4 MMOL/L (ref 3.5–5.5)
RBC # BLD AUTO: 3.04 M/UL (ref 4.2–5.3)
SODIUM SERPL-SCNC: 138 MMOL/L (ref 136–145)
WBC # BLD AUTO: 8.9 K/UL (ref 4.6–13.2)

## 2017-10-04 PROCEDURE — 74011000258 HC RX REV CODE- 258: Performed by: EMERGENCY MEDICINE

## 2017-10-04 PROCEDURE — 65270000029 HC RM PRIVATE

## 2017-10-04 PROCEDURE — 77030011256 HC DRSG MEPILEX <16IN NO BORD MOLN -A

## 2017-10-04 PROCEDURE — 85025 COMPLETE CBC W/AUTO DIFF WBC: CPT | Performed by: INTERNAL MEDICINE

## 2017-10-04 PROCEDURE — 74011250637 HC RX REV CODE- 250/637: Performed by: INTERNAL MEDICINE

## 2017-10-04 PROCEDURE — 74011250636 HC RX REV CODE- 250/636: Performed by: EMERGENCY MEDICINE

## 2017-10-04 PROCEDURE — 74011250636 HC RX REV CODE- 250/636: Performed by: INTERNAL MEDICINE

## 2017-10-04 PROCEDURE — 80048 BASIC METABOLIC PNL TOTAL CA: CPT | Performed by: INTERNAL MEDICINE

## 2017-10-04 PROCEDURE — 36415 COLL VENOUS BLD VENIPUNCTURE: CPT | Performed by: INTERNAL MEDICINE

## 2017-10-04 PROCEDURE — 77030020263 HC SOL INJ SOD CL0.9% LFCR 1000ML

## 2017-10-04 RX ORDER — FENTANYL 50 UG/1
1 PATCH TRANSDERMAL
Status: DISCONTINUED | OUTPATIENT
Start: 2017-10-04 | End: 2017-10-06 | Stop reason: HOSPADM

## 2017-10-04 RX ADMIN — HEPARIN SODIUM 5000 UNITS: 5000 INJECTION, SOLUTION INTRAVENOUS; SUBCUTANEOUS at 05:06

## 2017-10-04 RX ADMIN — LEVOTHYROXINE SODIUM 75 MCG: 75 TABLET ORAL at 08:15

## 2017-10-04 RX ADMIN — SODIUM CHLORIDE 75 ML/HR: 900 INJECTION, SOLUTION INTRAVENOUS at 08:08

## 2017-10-04 RX ADMIN — CEFTRIAXONE 1 G: 1 INJECTION, POWDER, FOR SOLUTION INTRAMUSCULAR; INTRAVENOUS at 10:12

## 2017-10-04 RX ADMIN — CLONIDINE HYDROCHLORIDE 0.2 MG: 0.1 TABLET ORAL at 10:15

## 2017-10-04 RX ADMIN — SODIUM CHLORIDE 1250 MG: 900 INJECTION, SOLUTION INTRAVENOUS at 13:08

## 2017-10-04 RX ADMIN — HEPARIN SODIUM 5000 UNITS: 5000 INJECTION, SOLUTION INTRAVENOUS; SUBCUTANEOUS at 21:37

## 2017-10-04 RX ADMIN — LOSARTAN POTASSIUM 50 MG: 50 TABLET ORAL at 10:09

## 2017-10-04 RX ADMIN — HEPARIN SODIUM 5000 UNITS: 5000 INJECTION, SOLUTION INTRAVENOUS; SUBCUTANEOUS at 14:25

## 2017-10-04 RX ADMIN — NYSTATIN: 100000 POWDER TOPICAL at 10:39

## 2017-10-04 RX ADMIN — NYSTATIN: 100000 POWDER TOPICAL at 18:14

## 2017-10-04 NOTE — MED STUDENT NOTES
Vascular Surgery consult    Date of service: 10/4/2017    Cc: right leg cellulitis    HPI: This is an 87wf with past medical history of thyroid disease, chronic lymphedema, and bedbound admitted for right lower leg cellulitis not responsive to outpatient treatment with doxycycline. Pt states that she initially noticed left leg cellulitis 6 weeks ago, which was treated with Keflex by a visiting physician. The left leg cellulitis eventually improved, however, 3 weeks ago, pt noticed that right leg began to have similar redness, welling, and pain. This was subsequently treated with doxycycline but has not resolved. During her hospital course, she is receiving vancomycin and ceftriaxone. Pt reports that she has not noticed improvement in her right leg. Pt currently denies       ROS:    *ATTENTION:  This note has been created by a medical student for educational purposes only. Please do not refer to the content of this note for clinical decision-making, billing, or other purposes. Please see attending physicians note to obtain clinical information on this patient. *

## 2017-10-04 NOTE — PROGRESS NOTES
Hospitalist Progress Note  Flor Moseley MD  Internal medicine/ Hospitalist    Daily Progress Note: 10/4/2017 11:35 AM      Interval history / Subjective:   Admitted for cellulitis of rt leg after failing outpatient management with doxycycline. Patient is on ceftriaxone and vanc. Still reporting pain rt leg. Patient says that her leg pain is more than before. Current Facility-Administered Medications   Medication Dose Route Frequency    cefTRIAXone (ROCEPHIN) 1 g in 0.9% sodium chloride (MBP/ADV) 50 mL MBP  1 g IntraVENous Q24H    vancomycin (VANCOCIN) 1,250 mg in 0.9% sodium chloride 250 mL IVPB  1,250 mg IntraVENous Q24H    [START ON 10/6/2017] VANCOMYCIN INFORMATION NOTE   Other ONCE    cloNIDine HCl (CATAPRES) tablet 0.2 mg  0.2 mg Oral BID    levothyroxine (SYNTHROID) tablet 75 mcg  75 mcg Oral ACB    losartan (COZAAR) tablet 50 mg  50 mg Oral DAILY    0.9% sodium chloride infusion  75 mL/hr IntraVENous CONTINUOUS    heparin (porcine) injection 5,000 Units  5,000 Units SubCUTAneous Q8H    nystatin (MYCOSTATIN) 100,000 unit/gram powder   Topical BID    hydrALAZINE (APRESOLINE) 20 mg/mL injection 10 mg  10 mg IntraVENous Q6H PRN        Review of Systems  Pain rt leg,no fever or chills. Objective:     Visit Vitals    /64    Pulse 60    Temp 99.4 °F (37.4 °C)    Resp 18    Ht 5' 8\" (1.727 m)    Wt 112.2 kg (247 lb 5.7 oz)    SpO2 97%    BMI 37.61 kg/m2      O2 Device: Room air    Temp (24hrs), Av.8 °F (37.1 °C), Min:97.6 °F (36.4 °C), Max:99.5 °F (37.5 °C)      10/04 0701 - 10/04 1900  In: 240 [P.O.:240]  Out: -   10/02 1901 - 10/04 0700  In: 1500 [P.O.:120; I.V.:1380]  Out: -    P/E:  General:                    Lying in bed in no acute distress. HEENT:                     Pupils equal.  Sclera anicteric. Conjunctiva pink. Mucous membranes                           moist.  CV:                  Regular rate and rhythm.   Lungs:                       Clear to auscultation bilaterally. No Wheezing or Rhonchi. No rales. Abdomen:                  Soft, non-tender. Not distended. Extremities:               Lymphoedema rt leg,redness,warm and tender to palaption  Neurologic:                Alert and oriented X 3. Skin:                Warm and dry. No rashes. Back:               Pressure ulcers in buttocks    Data Review    Recent Results (from the past 12 hour(s))   METABOLIC PANEL, BASIC    Collection Time: 10/04/17  6:29 AM   Result Value Ref Range    Sodium 138 136 - 145 mmol/L    Potassium 4.4 3.5 - 5.5 mmol/L    Chloride 105 100 - 108 mmol/L    CO2 23 21 - 32 mmol/L    Anion gap 10 3.0 - 18 mmol/L    Glucose 81 74 - 99 mg/dL    BUN 27 (H) 7.0 - 18 MG/DL    Creatinine 1.32 (H) 0.6 - 1.3 MG/DL    BUN/Creatinine ratio 20 12 - 20      GFR est AA 46 (L) >60 ml/min/1.73m2    GFR est non-AA 38 (L) >60 ml/min/1.73m2    Calcium 7.9 (L) 8.5 - 10.1 MG/DL   CBC WITH AUTOMATED DIFF    Collection Time: 10/04/17  7:00 AM   Result Value Ref Range    WBC 8.9 4.6 - 13.2 K/uL    RBC 3.04 (L) 4.20 - 5.30 M/uL    HGB 9.3 (L) 12.0 - 16.0 g/dL    HCT 28.5 (L) 35.0 - 45.0 %    MCV 93.8 74.0 - 97.0 FL    MCH 30.6 24.0 - 34.0 PG    MCHC 32.6 31.0 - 37.0 g/dL    RDW 13.8 11.6 - 14.5 %    PLATELET 983 482 - 085 K/uL    MPV 11.0 9.2 - 11.8 FL    NEUTROPHILS 75 (H) 40 - 73 %    LYMPHOCYTES 13 (L) 21 - 52 %    MONOCYTES 9 3 - 10 %    EOSINOPHILS 3 0 - 5 %    BASOPHILS 0 0 - 2 %    ABS. NEUTROPHILS 6.7 1.8 - 8.0 K/UL    ABS. LYMPHOCYTES 1.2 0.9 - 3.6 K/UL    ABS. MONOCYTES 0.8 0.05 - 1.2 K/UL    ABS. EOSINOPHILS 0.3 0.0 - 0.4 K/UL    ABS.  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 Cellulitis of right leg:s/p failed outpatient treatment with doxycycline    -On ceftriaxone and vanc.    -Elevate leg    -Blood cx negative.    -Pt complaining of more rt leg pain than past years. Duplex negative for DVT. Vascular consulted as this can be complication of rt lymphoedema,venous insufficiency on top of cellulitis. 2.Hypothyroidism:    -On synthroid    3. Venous insufficiency    -Elevate leg    -No dvt on duplex  4. HTN    -Hydralazine prn  5. Osteoarthritis    -Tylenol prn for pain  6. Hx cva   7. Hx psoriasis  8. Decubitus ulcers:wound care consult     DVT prophylaxis:scds heparin  Full code  Disposition:tbd

## 2017-10-04 NOTE — PROGRESS NOTES
Problem: Falls - Risk of  Goal: *Absence of Falls  Document Marizol Fall Risk and appropriate interventions in the flowsheet.    Outcome: Progressing Towards Goal  Fall Risk Interventions:              Medication Interventions: Patient to call before getting OOB     Elimination Interventions: Call light in reach

## 2017-10-04 NOTE — PROGRESS NOTES
1945  Received bedside verbal shift report. Pt lying in bed resting comfortably. 2lnc in place. Piv # 22 to left arm with ns infusing at 75 ml/hr. perwick cath to drainage with brown urine noted. Call bell within reach, side rails up x 3, bed low and locked. No complaints offered, pt instructed to call for assistance. 0710  Bedside and Verbal shift change report given to issac panda rn  (oncoming nurse) by Jsoe E Dillon rn  (offgoing nurse). Report included the following information SBAR, Kardex, Intake/Output, MAR and Recent Results.

## 2017-10-04 NOTE — CONSULTS
[facility]  [address] [facility] [facility]  [address], [address], [address] [place] [address]  Dr. Emy Romero, Dr. Wicho Sousa August  [phone] FAX# [phone]    Consult    Patient: Sanjuanita Valdez MRN: [id number]  SSN: [id number]    YOB: 1930  Age: 80 y.o. Sex: female      Subjective:      Sanjuanita Valdez is a 80 y.o. female who is being seen for right lower extremity cellulitis. She was admitted after outpatient management of her cellulitis failed. Pt denies fever or chills. She is non-ambulatory. Past Medical History:   Diagnosis Date    Arthropathy, unspecified, site unspecified     Chronic venous insufficiency 2/21/2013    Degenerative disc disease     Osteoarthritis     Peripheral edema 2/21/2013    Psoriatic arthritis (HCC)     Reflux     Thyroid disease     Unspecified cerebral artery occlusion with cerebral infarction     Unspecified cerebral artery occlusion with cerebral infarction     Unspecified hypothyroidism     Urinary frequency 2/21/2013     Past Surgical History:   Procedure Laterality Date    HX HYSTERECTOMY        History reviewed. No pertinent family history.   Social History   Substance Use Topics    Smoking status: Former Smoker    Smokeless tobacco: Former User    Alcohol use No      Current Facility-Administered Medications   Medication Dose Route Frequency Provider Last Rate Last Dose    fentaNYL (DURAGESIC) 50 mcg/hr patch 1 Patch  1 Patch TransDERmal Q72H Gisela Nicholas MD        cefTRIAXone (ROCEPHIN) 1 g in 0.9% sodium chloride (MBP/ADV) 50 mL MBP  1 g IntraVENous Q24H Georgia Push,  mL/hr at 10/04/17 1012 1 g at 10/04/17 1012    vancomycin (VANCOCIN) 1,250 mg in 0.9% sodium chloride 250 mL IVPB  1,250 mg IntraVENous Q24H Georgia Push,  mL/hr at 10/04/17 1308 1,250 mg at 10/04/17 1308    [START ON 10/6/2017] VANCOMYCIN INFORMATION NOTE   Other ONCE Alma Escobar,         cloNIDine HCl (CATAPRES) tablet 0.2 mg  0.2 mg Oral BID Sonia Swenson MD   0.2 mg at 10/04/17 1015    levothyroxine (SYNTHROID) tablet 75 mcg  75 mcg Oral ACB Sonia Swenson MD   75 mcg at 10/04/17 0815    losartan (COZAAR) tablet 50 mg  50 mg Oral DAILY Sonia Swenson MD   50 mg at 10/04/17 1009    0.9% sodium chloride infusion  75 mL/hr IntraVENous CONTINUOUS Sonia Swenson MD 75 mL/hr at 10/04/17 0808 75 mL/hr at 10/04/17 9716    heparin (porcine) injection 5,000 Units  5,000 Units SubCUTAneous Mekhi Lozano MD   5,000 Units at 10/04/17 0506    nystatin (MYCOSTATIN) 100,000 unit/gram powder   Topical BID Sonia Swenson MD        hydrALAZINE (APRESOLINE) 20 mg/mL injection 10 mg  10 mg IntraVENous Q6H PRN Sonia Swenson MD            Allergies   Allergen Reactions    Bactrim [Sulfamethoprim] Nausea and Vomiting    E-Mycin [Erythromycin] Unknown (comments)    Iodine Unknown (comments)    Milk Containing Products Other (comments)     Gi distress       Review of Systems:  Pertinent items are noted in the History of Present Illness. Objective:     Vitals:    10/04/17 0617 10/04/17 0953 10/04/17 1009 10/04/17 1015   BP: 108/74 123/64     Pulse: (!) 53 (!) 57 60 60   Resp: 18 16     Temp: 98.4 °F (36.9 °C) 99.4 °F (37.4 °C)     SpO2: 97% 97%     Weight: 112.2 kg (247 lb 5.7 oz)      Height:            Physical Exam:  GENERAL: alert, cooperative, no distress, appears stated age  THROAT & NECK: normal and no erythema or exudates noted. LUNG: clear to auscultation bilaterally  HEART: regular rate and rhythm, S1, S2 normal, no murmur, click, rub or gallop  ABDOMEN: soft, non-tender.  Bowel sounds normal. No masses,  no organomegaly  EXTREMITIES:  extremities normal, atraumatic, no cyanosis or edema, edema 1+ RLE with mild erythema  SKIN: Normal.    Peripheral Venous Testing     REASON FOR TEST  Pain in limb, Limb swelling, Cellulitis     Right Leg:-  Deep venous thrombosis:           No  Superficial venous thrombosis:    No  Deep venous insufficiency:        Not examined  Superficial venous insufficiency: Not examined        INTERPRETATION/FINDINGS  Duplex images were obtained using 2-D gray scale, color flow, and  spectral Doppler analysis. Right leg :  Technically difficult, limited examination with sub optimal imaging  due to inability to position patient properly, as well as patient body   habitus and pain. 1. Deep vein(s) visualized include the common femoral, proximal  femoral, mid femoral, distal femoral and posterior tibial veins. Unable to visualize the popliteal and peroneal veins. 2. No evidence of deep venous thrombosis detected in the veins  visualized. 3. No evidence of deep vein thrombosis in the contralateral common  femoral vein. 4. No evidence of superficial thrombosis detected. Assessment:     Hospital Problems  Date Reviewed: 10/3/2017          Codes Class Noted POA    * (Principal)Cellulitis of right leg ICD-10-CM: L03.115  ICD-9-CM: 682.6  10/3/2017 Yes        Venous insufficiency of both lower extremities ICD-10-CM: I87.2  ICD-9-CM: 459.81  10/3/2017 Yes        History of CVA (cerebrovascular accident) (Chronic) ICD-10-CM: Z86.73  ICD-9-CM: V12.54  6/8/2015 Yes        Hypothyroidism (Chronic) ICD-10-CM: E03.9  ICD-9-CM: 244.9  6/8/2015 Yes        Hypertension (Chronic) ICD-10-CM: I10  ICD-9-CM: 401.9  6/8/2015 Yes        Legal blindness (Chronic) ICD-10-CM: H54.8  ICD-9-CM: 369.4  6/8/2015 Yes        Osteoarthritis (Chronic) ICD-10-CM: M19.90  ICD-9-CM: 715.90  Unknown Yes        Psoriatic arthritis ([facility] [address] (Chronic) ICD-10-CM: L40.50  ICD-9-CM: 696.0  Unknown Yes              Plan:     Medical management as documented. There is no indication for surgical management at this time.   There is no indication of acute or chronic venous disease    I would like to thank  Jillian Robertson for this referral    Signed By: Lila Ledezma MD     October 4, 2017

## 2017-10-04 NOTE — ACP (ADVANCE CARE PLANNING)
Patient has designated _____daughter___________________ to participate in his/her discharge plan and to receive any needed information.      Name: Sourav Reagan as pt   Phone [phone]

## 2017-10-05 ENCOUNTER — APPOINTMENT (OUTPATIENT)
Dept: GENERAL RADIOLOGY | Age: 82
DRG: 603 | End: 2017-10-05
Attending: INTERNAL MEDICINE
Payer: MEDICARE

## 2017-10-05 LAB
ANION GAP SERPL CALC-SCNC: 9 MMOL/L (ref 3–18)
BASOPHILS # BLD: 0 K/UL (ref 0–0.06)
BASOPHILS NFR BLD: 1 % (ref 0–2)
BUN SERPL-MCNC: 32 MG/DL (ref 7–18)
BUN/CREAT SERPL: 24 (ref 12–20)
CALCIUM SERPL-MCNC: 8.1 MG/DL (ref 8.5–10.1)
CHLORIDE SERPL-SCNC: 106 MMOL/L (ref 100–108)
CO2 SERPL-SCNC: 24 MMOL/L (ref 21–32)
CREAT SERPL-MCNC: 1.34 MG/DL (ref 0.6–1.3)
DIFFERENTIAL METHOD BLD: ABNORMAL
EOSINOPHIL # BLD: 0.3 K/UL (ref 0–0.4)
EOSINOPHIL NFR BLD: 3 % (ref 0–5)
ERYTHROCYTE [DISTWIDTH] IN BLOOD BY AUTOMATED COUNT: 13.7 % (ref 11.6–14.5)
GLUCOSE SERPL-MCNC: 82 MG/DL (ref 74–99)
HCT VFR BLD AUTO: 26.5 % (ref 35–45)
HGB BLD-MCNC: 8.6 G/DL (ref 12–16)
LYMPHOCYTES # BLD: 1 K/UL (ref 0.9–3.6)
LYMPHOCYTES NFR BLD: 13 % (ref 21–52)
MCH RBC QN AUTO: 30.4 PG (ref 24–34)
MCHC RBC AUTO-ENTMCNC: 32.5 G/DL (ref 31–37)
MCV RBC AUTO: 93.6 FL (ref 74–97)
MONOCYTES # BLD: 0.9 K/UL (ref 0.05–1.2)
MONOCYTES NFR BLD: 11 % (ref 3–10)
NEUTS SEG # BLD: 5.9 K/UL (ref 1.8–8)
NEUTS SEG NFR BLD: 72 % (ref 40–73)
PLATELET # BLD AUTO: 302 K/UL (ref 135–420)
PMV BLD AUTO: 11.2 FL (ref 9.2–11.8)
POTASSIUM SERPL-SCNC: 4.8 MMOL/L (ref 3.5–5.5)
RBC # BLD AUTO: 2.83 M/UL (ref 4.2–5.3)
SODIUM SERPL-SCNC: 139 MMOL/L (ref 136–145)
WBC # BLD AUTO: 8.1 K/UL (ref 4.6–13.2)

## 2017-10-05 PROCEDURE — 73600 X-RAY EXAM OF ANKLE: CPT

## 2017-10-05 PROCEDURE — 74011636637 HC RX REV CODE- 636/637: Performed by: INTERNAL MEDICINE

## 2017-10-05 PROCEDURE — 74011250636 HC RX REV CODE- 250/636: Performed by: EMERGENCY MEDICINE

## 2017-10-05 PROCEDURE — 74011250637 HC RX REV CODE- 250/637: Performed by: INTERNAL MEDICINE

## 2017-10-05 PROCEDURE — 74011250636 HC RX REV CODE- 250/636: Performed by: INTERNAL MEDICINE

## 2017-10-05 PROCEDURE — 65270000029 HC RM PRIVATE

## 2017-10-05 PROCEDURE — 80048 BASIC METABOLIC PNL TOTAL CA: CPT | Performed by: INTERNAL MEDICINE

## 2017-10-05 PROCEDURE — 85025 COMPLETE CBC W/AUTO DIFF WBC: CPT | Performed by: INTERNAL MEDICINE

## 2017-10-05 PROCEDURE — 74011000258 HC RX REV CODE- 258: Performed by: EMERGENCY MEDICINE

## 2017-10-05 RX ORDER — PREDNISONE 20 MG/1
40 TABLET ORAL ONCE
Status: COMPLETED | OUTPATIENT
Start: 2017-10-05 | End: 2017-10-05

## 2017-10-05 RX ADMIN — LEVOTHYROXINE SODIUM 75 MCG: 75 TABLET ORAL at 07:46

## 2017-10-05 RX ADMIN — CLONIDINE HYDROCHLORIDE 0.2 MG: 0.1 TABLET ORAL at 10:12

## 2017-10-05 RX ADMIN — PREDNISONE 40 MG: 20 TABLET ORAL at 19:02

## 2017-10-05 RX ADMIN — CLONIDINE HYDROCHLORIDE 0.2 MG: 0.1 TABLET ORAL at 19:02

## 2017-10-05 RX ADMIN — HEPARIN SODIUM 5000 UNITS: 5000 INJECTION, SOLUTION INTRAVENOUS; SUBCUTANEOUS at 06:18

## 2017-10-05 RX ADMIN — HEPARIN SODIUM 5000 UNITS: 5000 INJECTION, SOLUTION INTRAVENOUS; SUBCUTANEOUS at 21:33

## 2017-10-05 RX ADMIN — LOSARTAN POTASSIUM 50 MG: 50 TABLET ORAL at 10:12

## 2017-10-05 RX ADMIN — HEPARIN SODIUM 5000 UNITS: 5000 INJECTION, SOLUTION INTRAVENOUS; SUBCUTANEOUS at 14:29

## 2017-10-05 RX ADMIN — NYSTATIN: 100000 POWDER TOPICAL at 19:02

## 2017-10-05 RX ADMIN — SODIUM CHLORIDE 75 ML/HR: 900 INJECTION, SOLUTION INTRAVENOUS at 12:10

## 2017-10-05 RX ADMIN — CEFTRIAXONE 1 G: 1 INJECTION, POWDER, FOR SOLUTION INTRAMUSCULAR; INTRAVENOUS at 10:13

## 2017-10-05 RX ADMIN — SODIUM CHLORIDE 1250 MG: 900 INJECTION, SOLUTION INTRAVENOUS at 14:28

## 2017-10-05 NOTE — ROUTINE PROCESS
0740  Bedside and Verbal shift change report given to Corinna Hdez (oncoming nurse) by Sherif Lloyd (offgoing nurse). Report included the following information SBAR, Kardex, Intake/Output and MAR.     0900  Shift assessment complete and due meds given. Pt has no complaints at this time    1600  Reassessment complete. No change in pt condition    1930  Bedside and Verbal shift change report given to Sherif Lloyd (oncoming nurse) by Corinna Hdez (offgoing nurse). Report included the following information SBAR, Kardex, Intake/Output and MAR.

## 2017-10-05 NOTE — PROGRESS NOTES
Problem: Falls - Risk of  Goal: *Absence of Falls  Document Marizol Fall Risk and appropriate interventions in the flowsheet.    Outcome: Progressing Towards Goal  Fall Risk Interventions:              Medication Interventions: Patient to call before getting OOB     Elimination Interventions: Call light in reach, Patient to call for help with toileting needs

## 2017-10-05 NOTE — PROGRESS NOTES
Hospitalist Progress Note  Najma Dempsey MD  Internal medicine/ Hospitalist    Daily Progress Note: 10/5/2017 11:35 AM      Interval history / Subjective:   Admitted for cellulitis of rt leg after failing outpatient management with doxycycline. Patient is on ceftriaxone and vanc. Still reporting pain rt leg. Patient says that her leg pain is more than before. Current Facility-Administered Medications   Medication Dose Route Frequency    [START ON 10/6/2017] VANCOMYCIN INFORMATION NOTE   Other ONCE    fentaNYL (DURAGESIC) 50 mcg/hr patch 1 Patch  1 Patch TransDERmal Q72H    cefTRIAXone (ROCEPHIN) 1 g in 0.9% sodium chloride (MBP/ADV) 50 mL MBP  1 g IntraVENous Q24H    vancomycin (VANCOCIN) 1,250 mg in 0.9% sodium chloride 250 mL IVPB  1,250 mg IntraVENous Q24H    cloNIDine HCl (CATAPRES) tablet 0.2 mg  0.2 mg Oral BID    levothyroxine (SYNTHROID) tablet 75 mcg  75 mcg Oral ACB    losartan (COZAAR) tablet 50 mg  50 mg Oral DAILY    0.9% sodium chloride infusion  75 mL/hr IntraVENous CONTINUOUS    heparin (porcine) injection 5,000 Units  5,000 Units SubCUTAneous Q8H    nystatin (MYCOSTATIN) 100,000 unit/gram powder   Topical BID    hydrALAZINE (APRESOLINE) 20 mg/mL injection 10 mg  10 mg IntraVENous Q6H PRN        Review of Systems  Pain rt leg,no fever or chills. Objective:     Visit Vitals    /74 (BP 1 Location: Right arm, BP Patient Position: Head of bed elevated (Comment degrees))    Pulse 60    Temp 98.6 °F (37 °C)    Resp 24    Ht 5' 8\" (1.727 m)    Wt 117.3 kg (258 lb 9.6 oz)    SpO2 94%    BMI 39.32 kg/m2      O2 Device: Room air    Temp (24hrs), Av.5 °F (36.9 °C), Min:97.5 °F (36.4 °C), Max:99.2 °F (37.3 °C)      10/05 0701 - 10/05 1900  In: 240 [P.O.:240]  Out: -   10/03 1901 - 10/05 0700  In: 3418.8 [P.O.:720; I.V.:2698.8]  Out: 200 [Urine:200]   P/E:  General:                    Lying in bed in no acute distress.   HEENT:                     Pupils equal.  Sclera anicteric. Conjunctiva pink. Mucous membranes                           moist.  CV:                  Regular rate and rhythm. Lungs:                       Clear to auscultation bilaterally. No Wheezing or Rhonchi. No rales. Abdomen:                  Soft, non-tender. Not distended. Extremities:               Lymphoedema rt leg,redness,warm and tender to palaption  Neurologic:                Alert and oriented X 3. Skin:                Warm and dry. No rashes. Back:               Pressure ulcers in buttocks    Data Review    Recent Results (from the past 12 hour(s))   METABOLIC PANEL, BASIC    Collection Time: 10/05/17  6:40 AM   Result Value Ref Range    Sodium 139 136 - 145 mmol/L    Potassium 4.8 3.5 - 5.5 mmol/L    Chloride 106 100 - 108 mmol/L    CO2 24 21 - 32 mmol/L    Anion gap 9 3.0 - 18 mmol/L    Glucose 82 74 - 99 mg/dL    BUN 32 (H) 7.0 - 18 MG/DL    Creatinine 1.34 (H) 0.6 - 1.3 MG/DL    BUN/Creatinine ratio 24 (H) 12 - 20      GFR est AA 45 (L) >60 ml/min/1.73m2    GFR est non-AA 37 (L) >60 ml/min/1.73m2    Calcium 8.1 (L) 8.5 - 10.1 MG/DL   CBC WITH AUTOMATED DIFF    Collection Time: 10/05/17  6:40 AM   Result Value Ref Range    WBC 8.1 4.6 - 13.2 K/uL    RBC 2.83 (L) 4.20 - 5.30 M/uL    HGB 8.6 (L) 12.0 - 16.0 g/dL    HCT 26.5 (L) 35.0 - 45.0 %    MCV 93.6 74.0 - 97.0 FL    MCH 30.4 24.0 - 34.0 PG    MCHC 32.5 31.0 - 37.0 g/dL    RDW 13.7 11.6 - 14.5 %    PLATELET 286 836 - 170 K/uL    MPV 11.2 9.2 - 11.8 FL    NEUTROPHILS 72 40 - 73 %    LYMPHOCYTES 13 (L) 21 - 52 %    MONOCYTES 11 (H) 3 - 10 %    EOSINOPHILS 3 0 - 5 %    BASOPHILS 1 0 - 2 %    ABS. NEUTROPHILS 5.9 1.8 - 8.0 K/UL    ABS. LYMPHOCYTES 1.0 0.9 - 3.6 K/UL    ABS. MONOCYTES 0.9 0.05 - 1.2 K/UL    ABS. EOSINOPHILS 0.3 0.0 - 0.4 K/UL    ABS.  BASOPHILS 0.0 0.0 - 0.06 K/UL    DF AUTOMATED           Assessment/Plan:     Principal Problem:    Cellulitis of right leg (10/3/2017)    Active Problems:    Osteoarthritis ()      Psoriatic arthritis ([facility] [address] ()      History of CVA (cerebrovascular accident) (6/8/2015)      Hypothyroidism (6/8/2015)      Hypertension (6/8/2015)      Legal blindness (6/8/2015)      Venous insufficiency of both lower extremities (10/3/2017)      Care Plan  1. Cellulitis of right leg:s/p failed outpatient treatment with doxycycline    -On ceftriaxone and vanc.    -Elevate leg    -Blood cx negative.    -Pt complaining of more rt leg pain than past years. Duplex negative for DVT. Vascular consulted and has determined that there is no indication of acute or chronic venous disease. Will continue to treat for cellulitis. 2.Hypothyroidism:    -On synthroid    3. Venous insufficiency per record. -Vascular surgery saw patient,no evidence of venous disease.    -Elevate leg    -No dvt on duplex  4. HTN    -Hydralazine prn  5. Osteoarthritis    -Tylenol prn for pain  6. Hx cva   7. Hx psoriasis  8. Decubitus ulcers:wound care consult  9. Ankle pain:possiblt OA    -Xray of ankle.     -Prednisone 40 mg po today     DVT prophylaxis:scds heparin  Full code  Disposition:tbd

## 2017-10-05 NOTE — ROUTINE PROCESS
0750  Bedside and Verbal shift change report given to Maria Eugenia Alcantara (oncoming nurse) by Praful Gerard (offgoing nurse). Report included the following information SBAR, Kardex, Intake/Output and MAR.     1000  Shift assessment complete and due meds given. Pt has no complaints at this time. 1600  Reassessment complete. No change in pt condition    1940  Bedside and Verbal shift change report given to [address] [place] [address] (oncoming nurse) by Maria Eugenia Alcantara (offgoing nurse). Report included the following information SBAR, Kardex, Intake/Output and MAR.

## 2017-10-05 NOTE — PROGRESS NOTES
1945  Received bedside verbal shift report. Pt lying in bed resting comfortably. 2lnc in place. Piv # 22 to left arm with ns infusing at 75 ml/hr. perwick cath to drainage with brown urine noted. Call bell within reach, side rails up x 3, bed low and locked. No complaints offered, pt instructed to call for assistance.     0719  Bedside and Verbal shift change report given to tre pineda  (oncoming nurse) by Edward Macias rn  (offgoing nurse). Report included the following information SBAR, Kardex, Intake/Output, MAR and Recent Results.

## 2017-10-06 VITALS
RESPIRATION RATE: 16 BRPM | OXYGEN SATURATION: 95 % | SYSTOLIC BLOOD PRESSURE: 144 MMHG | BODY MASS INDEX: 39.69 KG/M2 | DIASTOLIC BLOOD PRESSURE: 76 MMHG | WEIGHT: 261.9 LBS | HEART RATE: 63 BPM | HEIGHT: 68 IN | TEMPERATURE: 97.7 F

## 2017-10-06 LAB
ANION GAP SERPL CALC-SCNC: 9 MMOL/L (ref 3–18)
BASOPHILS # BLD: 0 K/UL (ref 0–0.06)
BASOPHILS NFR BLD: 0 % (ref 0–2)
BUN SERPL-MCNC: 29 MG/DL (ref 7–18)
BUN/CREAT SERPL: 24 (ref 12–20)
CALCIUM SERPL-MCNC: 8.2 MG/DL (ref 8.5–10.1)
CHLORIDE SERPL-SCNC: 105 MMOL/L (ref 100–108)
CO2 SERPL-SCNC: 20 MMOL/L (ref 21–32)
CREAT SERPL-MCNC: 1.19 MG/DL (ref 0.6–1.3)
DATE LAST DOSE: ABNORMAL
DIFFERENTIAL METHOD BLD: ABNORMAL
EOSINOPHIL # BLD: 0 K/UL (ref 0–0.4)
EOSINOPHIL NFR BLD: 0 % (ref 0–5)
ERYTHROCYTE [DISTWIDTH] IN BLOOD BY AUTOMATED COUNT: 13.3 % (ref 11.6–14.5)
GLUCOSE SERPL-MCNC: 137 MG/DL (ref 74–99)
HCT VFR BLD AUTO: 29.6 % (ref 35–45)
HGB BLD-MCNC: 9.5 G/DL (ref 12–16)
LYMPHOCYTES # BLD: 0.5 K/UL (ref 0.9–3.6)
LYMPHOCYTES NFR BLD: 6 % (ref 21–52)
MCH RBC QN AUTO: 30.2 PG (ref 24–34)
MCHC RBC AUTO-ENTMCNC: 32.1 G/DL (ref 31–37)
MCV RBC AUTO: 94 FL (ref 74–97)
MONOCYTES # BLD: 0.1 K/UL (ref 0.05–1.2)
MONOCYTES NFR BLD: 1 % (ref 3–10)
NEUTS SEG # BLD: 8.8 K/UL (ref 1.8–8)
NEUTS SEG NFR BLD: 93 % (ref 40–73)
PLATELET # BLD AUTO: 308 K/UL (ref 135–420)
PMV BLD AUTO: 11.2 FL (ref 9.2–11.8)
POTASSIUM SERPL-SCNC: 5 MMOL/L (ref 3.5–5.5)
RBC # BLD AUTO: 3.15 M/UL (ref 4.2–5.3)
REPORTED DOSE,DOSE: ABNORMAL UNITS
REPORTED DOSE/TIME,TMG: 1300
SODIUM SERPL-SCNC: 134 MMOL/L (ref 136–145)
VANCOMYCIN TROUGH SERPL-MCNC: 20.8 UG/ML (ref 10–20)
WBC # BLD AUTO: 9.5 K/UL (ref 4.6–13.2)

## 2017-10-06 PROCEDURE — 74011250636 HC RX REV CODE- 250/636: Performed by: INTERNAL MEDICINE

## 2017-10-06 PROCEDURE — 77030020263 HC SOL INJ SOD CL0.9% LFCR 1000ML

## 2017-10-06 PROCEDURE — 74011250637 HC RX REV CODE- 250/637: Performed by: INTERNAL MEDICINE

## 2017-10-06 PROCEDURE — 80202 ASSAY OF VANCOMYCIN: CPT | Performed by: INTERNAL MEDICINE

## 2017-10-06 PROCEDURE — 36415 COLL VENOUS BLD VENIPUNCTURE: CPT | Performed by: INTERNAL MEDICINE

## 2017-10-06 PROCEDURE — 74011000258 HC RX REV CODE- 258: Performed by: EMERGENCY MEDICINE

## 2017-10-06 PROCEDURE — 74011636637 HC RX REV CODE- 636/637: Performed by: INTERNAL MEDICINE

## 2017-10-06 PROCEDURE — 74011250636 HC RX REV CODE- 250/636: Performed by: EMERGENCY MEDICINE

## 2017-10-06 PROCEDURE — 80048 BASIC METABOLIC PNL TOTAL CA: CPT | Performed by: INTERNAL MEDICINE

## 2017-10-06 PROCEDURE — 85025 COMPLETE CBC W/AUTO DIFF WBC: CPT | Performed by: INTERNAL MEDICINE

## 2017-10-06 RX ORDER — CLINDAMYCIN HYDROCHLORIDE 300 MG/1
300 CAPSULE ORAL 3 TIMES DAILY
Qty: 15 CAP | Refills: 0 | Status: SHIPPED | OUTPATIENT
Start: 2017-10-06

## 2017-10-06 RX ORDER — PREDNISONE 20 MG/1
40 TABLET ORAL ONCE
Status: COMPLETED | OUTPATIENT
Start: 2017-10-06 | End: 2017-10-06

## 2017-10-06 RX ORDER — NYSTATIN 100000 [USP'U]/G
POWDER TOPICAL 2 TIMES DAILY
Qty: 1 BOTTLE | Refills: 0 | Status: SHIPPED | OUTPATIENT
Start: 2017-10-06

## 2017-10-06 RX ADMIN — SODIUM CHLORIDE 75 ML/HR: 900 INJECTION, SOLUTION INTRAVENOUS at 04:37

## 2017-10-06 RX ADMIN — CEFTRIAXONE 1 G: 1 INJECTION, POWDER, FOR SOLUTION INTRAMUSCULAR; INTRAVENOUS at 09:28

## 2017-10-06 RX ADMIN — PREDNISONE 40 MG: 20 TABLET ORAL at 12:51

## 2017-10-06 RX ADMIN — LEVOTHYROXINE SODIUM 75 MCG: 75 TABLET ORAL at 09:23

## 2017-10-06 RX ADMIN — CLONIDINE HYDROCHLORIDE 0.2 MG: 0.1 TABLET ORAL at 09:23

## 2017-10-06 RX ADMIN — NYSTATIN: 100000 POWDER TOPICAL at 09:29

## 2017-10-06 RX ADMIN — HEPARIN SODIUM 5000 UNITS: 5000 INJECTION, SOLUTION INTRAVENOUS; SUBCUTANEOUS at 04:36

## 2017-10-06 RX ADMIN — LOSARTAN POTASSIUM 50 MG: 50 TABLET ORAL at 09:23

## 2017-10-06 NOTE — DISCHARGE SUMMARY
Discharge Summary    Patient: Dung Mackenzie               Sex: female          DOA: 10/3/2017         YOB: 1930      Age:  80 y.o.        LOS:  LOS: 3 days                Admit Date: 10/3/2017    Discharge Date: 10/6/2017    Admission Diagnoses: Cellulitis of right leg    Discharge Diagnoses:    Cellulitis of right leg - POA  Elevated creatinine - POA  Decubitus ulcers    -POA  Osteoarthritis   Hypothyroidism  GERD    Discharge Medications:     Current Discharge Medication List      START taking these medications    Details   nystatin (MYCOSTATIN) powder Apply  to affected area two (2) times a day. Qty: 1 Bottle, Refills: 0      clindamycin (CLEOCIN) 300 mg capsule Take 1 Cap by mouth three (3) times daily. Qty: 15 Cap, Refills: 0         CONTINUE these medications which have NOT CHANGED    Details   amLODIPine (NORVASC) 10 mg tablet Take 10 mg by mouth daily. hydrALAZINE (APRESOLINE) 25 mg tablet Take 25 mg by mouth two (2) times a day. pantoprazole (PROTONIX) 40 mg tablet Take 40 mg by mouth daily. fentaNYL (DURAGESIC) 50 mcg/hr PATCH 1 Patch by TransDERmal route every seventy-two (72) hours. polyethylene glycol (MIRALAX) 17 gram packet Take 17 g by mouth daily as needed. levothyroxine (SYNTHROID) 75 mcg tablet Take 1 Tab by mouth Daily (before breakfast). Qty: 30 Tab, Refills: 0         STOP taking these medications       doxycycline (ADOXA) 100 mg tablet Comments:   Reason for Stopping:         cloNIDine HCl (CATAPRES) 0.2 mg tablet Comments:   Reason for Stopping:         losartan (COZAAR) 50 mg tablet Comments:   Reason for Stopping:                Follow-up:PCP    Discharge Condition:Good    Activity:As tolerated    Diet:heart healthy    Wound Care:as per wound care instructions    Labs:  Labs: Results:       Chemistry Recent Labs      10/06/17   0500  10/05/17   0640  10/04/17   0629   GLU  137*  82  81   NA  134*  139  138   K  5.0  4.8  4.4   CL  105  106  105 CO2  20*  24  23   BUN  29*  32*  27*   CREA  1.19  1.34*  1.32*   CA  8.2*  8.1*  7.9*   AGAP  9  9  10   BUCR  24*  24*  20      CBC w/Diff Recent Labs      10/06/17   0500  10/05/17   0640  10/04/17   0700   WBC  9.5  8.1  8.9   RBC  3.15*  2.83*  3.04*   HGB  9.5*  8.6*  9.3*   HCT  29.6*  26.5*  28.5*   PLT  308  302  310   GRANS  93*  72  75*   LYMPH  6*  13*  13*   EOS  0  3  3      Cardiac Enzymes No results for input(s): CPK, CKND1, KEE in the last 72 hours. No lab exists for component: CKRMB, TROIP   Coagulation No results for input(s): PTP, INR, APTT in the last 72 hours. No lab exists for component: INREXT    Lipid Panel No results found for: CHOL, CHOLPOCT, CHOLX, CHLST, CHOLV, 993284, HDL, LDL, LDLC, DLDLP, 463551, VLDLC, VLDL, TGLX, TRIGL, TRIGP, TGLPOCT, CHHD, CHHDX   BNP No results for input(s): BNPP in the last 72 hours. Liver Enzymes No results for input(s): TP, ALB, TBIL, AP, SGOT, GPT in the last 72 hours.     No lab exists for component: DBIL   Thyroid Studies Lab Results   Component Value Date/Time    TSH 0.07 06/09/2015 09:35 AM          Imaging:    TYPE OF TEST: Peripheral Venous Testing/Duplex of rt extremity     REASON FOR TEST  Pain in limb, Limb swelling, Cellulitis     Right Leg:-  Deep venous thrombosis:           No  Superficial venous thrombosis:    No  Deep venous insufficiency:        Not examined  Superficial venous insufficiency: Not examined    Xray of rt ankle 10/5:  No acute osseous abnormalities identified on single image exam    Consults:   Vascular surgery    Treatment Team: Treatment Team: Attending Provider: Carlton Peters MD; Consulting Provider: Carlton Peters MD; Care Coordinator: Kyaw Sparks, RN; Care Manager: Kyaw Sparks, RN    Significant Diagnostic Studies:     Right Leg Duplex  Deep venous thrombosis:           No  Superficial venous thrombosis:    No  Deep venous insufficiency:        Not examined  Superficial venous insufficiency: Not examined    HISTORY OF PRESENT ILLNESS:     Brandon Bender is a 80 y.o.  female with h/o chronic venous insufficiency,gerd,thyroid disease,lymphaedema,was brought to the hospital after she failed outpatient for rt leg cellulitis. Although patient is able to talk,most of the history is provided by daughter here present. The daughter reports that patient two weeks ago patient was treated with keflex for cellulitis of left leg. Then a week ago she was noted with cellulitis of right leg. She was started on doxycycline but noted that the cellulitis was getting worse. So it was decided to bring her to the emergency room. The daughter thinks that patient had some elevated temp. Daughter also says that patient has lymphoedema rt leg and has been told that nothing could be done. In ER,rt lower extremity duplex did not show any dvt. Lab showed wbc 13. 6. Temp was 99. 6. Patient was started on iv atbx for cellulitis of rt leg and admitted. Hospital Course:   Admitted for cellulitis of rt leg after failing outpatient management with doxycycline. Patient is on ceftriaxone and vanc. Still reporting pain rt leg. Patient says that her leg pain is more than before. 1.Cellulitis of right leg:s/p failed outpatient treatment with doxycycline    -On ceftriaxone and vanc.    -Elevate leg    -Blood cx negative. -Duplex negative for DVT. Vascular consulted and has determined that there is no evidence of acute or chronic venous disease.    -Will discharge on clindamycin po x 5 days     -Educated patient and daughter about good legs hygiene      2. Hypothyroidism:    -On synthroid     3.Venous insufficiency per record. -Vascular surgery saw patient,no evidence of venous disease.    -Elevate leg    -No dvt on duplex    4. HTN    -Continue home medications  5. Osteoarthritis    -Continue routine pain medications. 6.Hx cva  7. Hx psoriasis  8. Decubitus ulcers:wound care consult  9. Ankle pain:possibly OA    -Xray of ankle w/o acute process      Time for discharge:40 minutes.       Full code     Mahnaz De Los Santos MD  October 6, 2017

## 2017-10-06 NOTE — PROGRESS NOTES
Care Management Interventions  PCP Verified by CM: Yes  Palliative Care Criteria Met (RRAT>21 & CHF Dx)?: No  Mode of Transport at Discharge: Other (see comment) (Medical transport)  Transition of Care Consult (CM Consult): [address]: No  Reason Outside [place]: Patient already serviced by other home care/hospice agency ([facility])  [address]: No  Physical Therapy Consult: No  Occupational Therapy Consult: No  Speech Therapy Consult: No  Current Support Network:  Other (Lives with dtr)  Confirm Follow Up Transport: Other (see comment) (medical transport)  Plan discussed with Pt/Family/Caregiver: Yes  Freedom of Choice Offered: Yes  Discharge Location  Discharge Placement: Home with home health

## 2017-10-06 NOTE — PROGRESS NOTES
Pharmacy Dosing Services: Vancomycin    Indication: cellulitis     Day of therapy: 3    Other Antimicrobials (Include dose, start day & day of therapy):  Ceftriaxone 1g IV q24h-10/3    Loading dose (date given): 2000mg-10/3  Current Maintenance dose: 1250mg IV 24h    Goal Vancomycin Level: 15-20  (Trough 15-20 for most infections, 20 for meningitis/osteomyelitis, pre-HD level ~25)    Vancomycin Level (if drawn):   10- 20.8 mcg/dl (~22 hrs post dose; prior to 3rd MD)    Significant Cultures:   10/3-Blood cx: NGTD     Renal function stable? (unstable defined as SCr increase of 0.5 mg/dL or > 50% increase from baseline, whichever is greater) (Y/N): Y     CAPD, Hemodialysis or Renal Replacement Therapy (Y/N): N     Recent Labs      10/06/17   0500  10/05/17   0640  10/04/17   0700  10/04/17   0629   CREA  1.19  1.34*   --   1.32*   BUN  29*  32*   --   27*   WBC  9.5  8.1  8.9   --      Temp (24hrs), Av.2 °F (36.8 °C), Min:97.5 °F (36.4 °C), Max:99.1 °F (37.3 °C)    Creatinine Clearance (Creatinine Clearance (ml/min)): 45.2 ml/min     Regimen assessment: decrease dose to 1g IV q24h  Maintenance dose: 1000 mg IV q24h (predicted trough ~16)  Next scheduled level: 10/9 prior to 1600 dose        Pharmacy will follow daily and adjust medications as appropriate for renal function and/or serum levels.     Thank you,  Barbi Maldonado

## 2017-10-06 NOTE — PROGRESS NOTES
Offered the pt's dgt FOC for ome care. She reported the pt is active with [facility] and chooses for her to to continue with them. FOC on the chart  Referral sent to [facility] via [address] and called to their intake rep,  Xochilt Batrlett, for f/u.

## 2017-10-06 NOTE — MANAGEMENT PLAN
Discharge Planning    1115:Spoke with patient and daughter. She is wishing to go to SNF for 30 days. Pt bed bound at home, is not going to be on long term antibiotics or have extensive wound care. Explained that there is no skilled need for SNF. And, insurance requires a skilled need. Offered options of self pay. Discharge Plan is Home with Home Health. Pt needs medical transport  1330: [facility] [employer] medical transport. Set transport up for first availble 1430. Pt requested bariatric transport.  PCS form at [address] [place] [address] RN BSN  Outcomes Manager  Pager # [phone]

## 2017-10-06 NOTE — PROGRESS NOTES
Problem: Falls - Risk of  Goal: *Absence of Falls  Document Marizol Fall Risk and appropriate interventions in the flowsheet.    Outcome: Progressing Towards Goal  Fall Risk Interventions:              Medication Interventions: Evaluate medications/consider consulting pharmacy     Elimination Interventions: Call light in reach, Patient to call for help with toileting needs

## 2017-10-06 NOTE — ROUTINE PROCESS
0800 - assumed care of patient - alert and oriented. No complaints of pain - inquiring about PureWick to use at home? Advised patient that this only works with continuous suction - information on product left at bedside for daughter. 0930 - AM meds given. 1000 - PT at bedside. PureWick changed - patient refused bath - did allow brina care     1030 - daughter at bedside    80 - MD at bedside - spoke with patient and daughter re: d/c home. Patient asking to stay here for 3 weeks or to a nursing home while daughter is out of town for family wedding - case managment at bedside and explained patient does not meed criteria for either option. Plan to d/c home later this afternoon with medical transport. 1200 - wound care at bedside - patient refused to turn and allow wound care assessment. MD aware. 1330 - per Xiomy Borja, case management, medical transport set up for 1430. Patient and daughter updated. Reviewed discharge instructions, follow-up appointment with PCP and prescriptions. 1440 - medical transport at bedside - discussed with daughter the need for follow up evaluation & treatment plan of patient's wounds as we were unable to assess those today. She verbalized understanding and said patient has [facility] and a nurse twice weekly.     1500 - patient discharged with medical transport - sign pad not working in patient's room - hard copy last page signed and in chart

## 2017-10-06 NOTE — ANCILLARY DISCHARGE INSTRUCTIONS
Patient and/or next of kin has been given the [facility] Important Message From Medicare About Your Rights\" letter and all questions were answered. Paper copy signed.

## 2017-10-06 NOTE — WOUND CARE
Received IP WOUND CARE CONSULT per Dr. Lyndon Whitfield. Arrived to patient's room, patient in no distress and daughter present at bedside. Introduced myself and explained the purpose of the wound care consult and what would take place. Patient refused consult and said \" no thank you, I don't want to be turned from side to side and I don't think they are that serious. \" Plan for patient to be discharged today. Encouraged patient and family to notify the nurse to get in contact with Wound Care Team should patient change her mind. Discussed findings and reported patient's statements to [facility].

## 2017-10-06 NOTE — ROUTINE PROCESS
Plan:  To provide an enjoyable diversion. Implementation:  Provided live bedside harp music, varied styles of music. Evaluation:  Patient and daughter present, patient requested \"How Great Thou Art\" and cried during it. States \"you came at the perfect time today\".

## 2017-10-06 NOTE — DISCHARGE INSTRUCTIONS
Patient armband removed and shredded      DISCHARGE SUMMARY from Nurse    The following personal items are in your possession at time of discharge:    Dental Appliances: None  Visual Aid: None     Home Medications: Sent home     Clothing: Other (comment) (gown)                PATIENT INSTRUCTIONS:    After general anesthesia or intravenous sedation, for 24 hours or while taking prescription Narcotics:  · Limit your activities  · Do not drive and operate hazardous machinery  · Do not make important personal or business decisions  · Do  not drink alcoholic beverages  · If you have not urinated within 8 hours after discharge, please contact your surgeon on call. Report the following to your surgeon:  · Excessive pain, swelling, redness or odor of or around the surgical area  · Temperature over 100.5  · Nausea and vomiting lasting longer than 4 hours or if unable to take medications  · Any signs of decreased circulation or nerve impairment to extremity: change in color, persistent  numbness, tingling, coldness or increase pain  · Any questions, please follow-up with Dr. Jennifer Perla       What to do at Home:  Recommended activity: Activity as tolerated. If you experience any of the following symptoms,          *  Please give a list of your current medications to your Primary Care Provider. *  Please update this list whenever your medications are discontinued, doses are      changed, or new medications (including over-the-counter products) are added. *  Please carry medication information at all times in case of emergency situations. These are general instructions for a healthy lifestyle:    No smoking/ No tobacco products/ Avoid exposure to second hand smoke    Surgeon General's Warning:  Quitting smoking now greatly reduces serious risk to your health.     Obesity, smoking, and sedentary lifestyle greatly increases your risk for illness    A healthy diet, regular physical exercise & weight monitoring are important for maintaining a healthy lifestyle    You may be retaining fluid if you have a history of heart failure or if you experience any of the following symptoms:  Weight gain of 3 pounds or more overnight or 5 pounds in a week, increased swelling in our hands or feet or shortness of breath while lying flat in bed. Please call your doctor as soon as you notice any of these symptoms; do not wait until your next office visit. Recognize signs and symptoms of STROKE:    F-face looks uneven    A-arms unable to move or move unevenly    S-speech slurred or non-existent    T-time-call 911 as soon as signs and symptoms begin-DO NOT go       Back to bed or wait to see if you get better-TIME IS BRAIN. Warning Signs of HEART ATTACK     Call 911 if you have these symptoms:   Chest discomfort. Most heart attacks involve discomfort in the center of the chest that lasts more than a few minutes, or that goes away and comes back. It can feel like uncomfortable pressure, squeezing, fullness, or pain.  Discomfort in other areas of the upper body. Symptoms can include pain or discomfort in one or both arms, the back, neck, jaw, or stomach.  Shortness of breath with or without chest discomfort.  Other signs may include breaking out in a cold sweat, nausea, or lightheadedness. Don't wait more than five minutes to call 911 - MINUTES MATTER! Fast action can save your life. Calling 911 is almost always the fastest way to get lifesaving treatment. Emergency Medical Services staff can begin treatment when they arrive -- up to an hour sooner than if someone gets to the hospital by car. The discharge information has been reviewed with the patient and caregiver. The patient and caregiver verbalized understanding. Discharge medications reviewed with the patient and caregiver and appropriate educational materials and side effects teaching were provided.              Cellulitis: Care Instructions  Your Care Instructions    Cellulitis is a skin infection. It often occurs after a break in the skin from a scrape, cut, bite, or puncture, or after a rash. The doctor has checked you carefully, but problems can develop later. If you notice any problems or new symptoms, get medical treatment right away. Follow-up care is a key part of your treatment and safety. Be sure to make and go to all appointments, and call your doctor if you are having problems. It's also a good idea to know your test results and keep a list of the medicines you take. How can you care for yourself at home? · Take your antibiotics as directed. Do not stop taking them just because you feel better. You need to take the full course of antibiotics. · Prop up the infected area on pillows to reduce pain and swelling. Try to keep the area above the level of your heart as often as you can. · If your doctor told you how to care for your wound, follow your doctor's instructions. If you did not get instructions, follow this general advice:  ¨ Wash the wound with clean water 2 times a day. Don't use hydrogen peroxide or alcohol, which can slow healing. ¨ You may cover the wound with a thin layer of petroleum jelly, such as Vaseline, and a nonstick bandage. ¨ Apply more petroleum jelly and replace the bandage as needed. · Be safe with medicines. Take pain medicines exactly as directed. ¨ If the doctor gave you a prescription medicine for pain, take it as prescribed. ¨ If you are not taking a prescription pain medicine, ask your doctor if you can take an over-the-counter medicine. To prevent cellulitis in the future  · Try to prevent cuts, scrapes, or other injuries to your skin. Cellulitis most often occurs where there is a break in the skin. · If you get a scrape, cut, mild burn, or bite, wash the wound with clean water as soon as you can to help avoid infection. Don't use hydrogen peroxide or alcohol, which can slow healing.   · If you have swelling in your legs (edema), support stockings and good skin care may help prevent leg sores and cellulitis. · Take care of your feet, especially if you have diabetes or other conditions that increase the risk of infection. Wear shoes and socks. Do not go barefoot. If you have athlete's foot or other skin problems on your feet, talk to your doctor about how to treat them. When should you call for help? Call your doctor now or seek immediate medical care if:  · You have signs that your infection is getting worse, such as:  ¨ Increased pain, swelling, warmth, or redness. ¨ Red streaks leading from the area. ¨ Pus draining from the area. ¨ A fever. · You get a rash. Watch closely for changes in your health, and be sure to contact your doctor if:  · You are not getting better after 1 day (24 hours). · You do not get better as expected. Where can you learn more? Go to http://davidsonLiquidSpacekalee.info/. Elvi Puga in the search box to learn more about \"Cellulitis: Care Instructions. \"  Current as of: October 13, 2016  Content Version: 11.3  © 3769-6895 [employer]. Care instructions adapted under license by [employer] (which disclaims liability or warranty for this information). If you have questions about a medical condition or this instruction, always ask your healthcare professional. [address] any warranty or liability for your use of this information. DASH Diet: Care Instructions  Your Care Instructions  The DASH diet is an eating plan that can help lower your blood pressure. DASH stands for Dietary Approaches to Stop Hypertension. Hypertension is high blood pressure. The DASH diet focuses on eating foods that are high in calcium, potassium, and magnesium. These nutrients can lower blood pressure. The foods that are highest in these nutrients are fruits, vegetables, low-fat dairy products, nuts, seeds, and legumes.  But taking calcium, potassium, and magnesium supplements instead of eating foods that are high in those nutrients does not have the same effect. The DASH diet also includes whole grains, fish, and poultry. The DASH diet is one of several lifestyle changes your doctor may recommend to lower your high blood pressure. Your doctor may also want you to decrease the amount of sodium in your diet. Lowering sodium while following the DASH diet can lower blood pressure even further than just the DASH diet alone. Follow-up care is a key part of your treatment and safety. Be sure to make and go to all appointments, and call your doctor if you are having problems. It's also a good idea to know your test results and keep a list of the medicines you take. How can you care for yourself at home? Following the DASH diet  · Eat 4 to 5 servings of fruit each day. A serving is 1 medium-sized piece of fruit, ½ cup chopped or canned fruit, 1/4 cup dried fruit, or 4 ounces (½ cup) of fruit juice. Choose fruit more often than fruit juice. · Eat 4 to 5 servings of vegetables each day. A serving is 1 cup of lettuce or raw leafy vegetables, ½ cup of chopped or cooked vegetables, or 4 ounces (½ cup) of vegetable juice. Choose vegetables more often than vegetable juice. · Get 2 to 3 servings of low-fat and fat-free dairy each day. A serving is 8 ounces of milk, 1 cup of yogurt, or 1 ½ ounces of cheese. · Eat 6 to 8 servings of grains each day. A serving is 1 slice of bread, 1 ounce of dry cereal, or ½ cup of cooked rice, pasta, or cooked cereal. Try to choose whole-grain products as much as possible. · Limit lean meat, poultry, and fish to 2 servings each day. A serving is 3 ounces, about the size of a deck of cards. · Eat 4 to 5 servings of nuts, seeds, and legumes (cooked dried beans, lentils, and split peas) each week. A serving is 1/3 cup of nuts, 2 tablespoons of seeds, or ½ cup of cooked beans or peas. · Limit fats and oils to 2 to 3 servings each day.  A serving is 1 teaspoon of vegetable oil or 2 tablespoons of salad dressing. · Limit sweets and added sugars to 5 servings or less a week. A serving is 1 tablespoon jelly or jam, ½ cup sorbet, or 1 cup of lemonade. · Eat less than 2,300 milligrams (mg) of sodium a day. If you limit your sodium to 1,500 mg a day, you can lower your blood pressure even more. Tips for success  · Start small. Do not try to make dramatic changes to your diet all at once. You might feel that you are missing out on your favorite foods and then be more likely to not follow the plan. Make small changes, and stick with them. Once those changes become habit, add a few more changes. · Try some of the following:  ¨ Make it a goal to eat a fruit or vegetable at every meal and at snacks. This will make it easy to get the recommended amount of fruits and vegetables each day. ¨ Try yogurt topped with fruit and nuts for a snack or healthy dessert. ¨ Add lettuce, tomato, cucumber, and onion to sandwiches. ¨ Combine a ready-made pizza crust with low-fat mozzarella cheese and lots of vegetable toppings. Try using tomatoes, squash, spinach, broccoli, carrots, cauliflower, and onions. ¨ Have a variety of cut-up vegetables with a low-fat dip as an appetizer instead of chips and dip. ¨ Sprinkle sunflower seeds or chopped almonds over salads. Or try adding chopped walnuts or almonds to cooked vegetables. ¨ Try some vegetarian meals using beans and peas. Add garbanzo or kidney beans to salads. Make burritos and tacos with mashed george beans or black beans. Where can you learn more? Go to http://davidson-kalee.info/. Enter Y886 in the search box to learn more about \"DASH Diet: Care Instructions. \"  Current as of: April 3, 2017  Content Version: 11.3  © 9607-4359 [employer]. Care instructions adapted under license by [employer] (which disclaims liability or warranty for this information).  If you have questions about a medical condition or this instruction, always ask your healthcare professional. [place] [address] any warranty or liability for your use of this information.

## 2017-10-09 LAB
BACTERIA SPEC CULT: NORMAL
BACTERIA SPEC CULT: NORMAL
SERVICE CMNT-IMP: NORMAL
SERVICE CMNT-IMP: NORMAL

## 2017-10-24 NOTE — ANCILLARY DISCHARGE INSTRUCTIONS
[facility] [address]  Discharge Phone Call       After-Care Discharge Phone Call Questions: no answer     Were you able to get your prescriptions filled? Comment:      [] Yes  []No    Comment if answer is \"No\"   Are you taking your medication(s) as your doctor ordered? Do you understand the purpose of your medications? Comment:    [] Yes  []No    Comment if answer is \"No\"   Are you taking any other medications that are not on the list?  Comment:      [] Yes  []No    Comment if answer is \"Yes\"   Do you have any questions about your medications? Are you aware of potential side effects? Comment:    [] Yes  []No    Comment if answer is \"Yes\"   Did you make your follow-up appointments (if the hospital did not do this before  discharge)? Comment:    [] Yes  []No    Comment if answer is \"No\"   Is there any reason you might not be able to keep your follow-up appointments? Comment:     [] Yes  []No    Comment if answer is \"Yes\"   Do you have any questions about your care plan? Are you aware of what health problems to be alert for? Comment:    [] Yes  []No    Comment if answer is \"Yes\"   Do you have a good understanding of how you should manage your health? Comment:    [] Yes  []No    Comment if answer is \"Yes\"   Do you know which symptoms to watch for that would mean you would need to call your doctor right away? Comment:      [] Yes  []No    Comment if answer is \"No\"   Do you have any questions about the follow up process or any instructions that we have provided? Comment:    [] Yes  []No    Comment if answer is \"Yes\"   Did staff take your preferences into account?         [] Yes  []No    Comment if answer is \"Yes\"